# Patient Record
Sex: FEMALE | Race: WHITE | NOT HISPANIC OR LATINO | Employment: PART TIME | URBAN - METROPOLITAN AREA
[De-identification: names, ages, dates, MRNs, and addresses within clinical notes are randomized per-mention and may not be internally consistent; named-entity substitution may affect disease eponyms.]

---

## 2017-02-03 ENCOUNTER — OFFICE VISIT (OUTPATIENT)
Dept: DERMATOLOGY | Facility: CLINIC | Age: 37
End: 2017-02-03

## 2017-02-03 DIAGNOSIS — Z79.899 MEDICATION MANAGEMENT: ICD-10-CM

## 2017-02-03 DIAGNOSIS — L40.9 PSORIASIS: Primary | ICD-10-CM

## 2017-02-03 LAB
ALT SERPL W P-5'-P-CCNC: 40 U/L (ref 0–50)
AST SERPL W P-5'-P-CCNC: 29 U/L (ref 0–45)
BASOPHILS # BLD AUTO: 0.2 10E9/L (ref 0–0.2)
BASOPHILS NFR BLD AUTO: 1.9 %
DIFFERENTIAL METHOD BLD: ABNORMAL
EOSINOPHIL # BLD AUTO: 0.9 10E9/L (ref 0–0.7)
EOSINOPHIL NFR BLD AUTO: 7.5 %
ERYTHROCYTE [DISTWIDTH] IN BLOOD BY AUTOMATED COUNT: 13.2 % (ref 10–15)
HBV CORE AB SERPL QL IA: NONREACTIVE
HBV SURFACE AG SERPL QL IA: NONREACTIVE
HCT VFR BLD AUTO: 45.8 % (ref 35–47)
HCV AB SERPL QL IA: NORMAL
HGB BLD-MCNC: 15.1 G/DL (ref 11.7–15.7)
HIV 1+2 AB+HIV1 P24 AG SERPL QL IA: NORMAL
IMM GRANULOCYTES # BLD: 0.1 10E9/L (ref 0–0.4)
IMM GRANULOCYTES NFR BLD: 0.4 %
LYMPHOCYTES # BLD AUTO: 2.7 10E9/L (ref 0.8–5.3)
LYMPHOCYTES NFR BLD AUTO: 24.1 %
MCH RBC QN AUTO: 30.9 PG (ref 26.5–33)
MCHC RBC AUTO-ENTMCNC: 33 G/DL (ref 31.5–36.5)
MCV RBC AUTO: 94 FL (ref 78–100)
MONOCYTES # BLD AUTO: 0.8 10E9/L (ref 0–1.3)
MONOCYTES NFR BLD AUTO: 6.7 %
NEUTROPHILS # BLD AUTO: 6.7 10E9/L (ref 1.6–8.3)
NEUTROPHILS NFR BLD AUTO: 59.4 %
NRBC # BLD AUTO: 0 10*3/UL
NRBC BLD AUTO-RTO: 0 /100
PLATELET # BLD AUTO: 383 10E9/L (ref 150–450)
RBC # BLD AUTO: 4.88 10E12/L (ref 3.8–5.2)
WBC # BLD AUTO: 11.3 10E9/L (ref 4–11)

## 2017-02-03 PROCEDURE — 86704 HEP B CORE ANTIBODY TOTAL: CPT | Performed by: DERMATOLOGY

## 2017-02-03 PROCEDURE — 87340 HEPATITIS B SURFACE AG IA: CPT | Performed by: DERMATOLOGY

## 2017-02-03 PROCEDURE — 86803 HEPATITIS C AB TEST: CPT | Performed by: DERMATOLOGY

## 2017-02-03 PROCEDURE — 86480 TB TEST CELL IMMUN MEASURE: CPT | Performed by: DERMATOLOGY

## 2017-02-03 PROCEDURE — 87389 HIV-1 AG W/HIV-1&-2 AB AG IA: CPT | Performed by: DERMATOLOGY

## 2017-02-03 RX ORDER — FLUOCINONIDE TOPICAL SOLUTION USP, 0.05% 0.5 MG/ML
SOLUTION TOPICAL
Qty: 60 ML | Refills: 5 | Status: SHIPPED | OUTPATIENT
Start: 2017-02-03 | End: 2017-05-04

## 2017-02-03 RX ORDER — CALCIPOTRIENE, BETAMETHASONE DIPROPIONATE 50; .643 UG/G; MG/G
OINTMENT TOPICAL
Qty: 100 G | Refills: 5 | Status: SHIPPED | OUTPATIENT
Start: 2017-02-03 | End: 2017-07-18

## 2017-02-03 ASSESSMENT — PAIN SCALES - GENERAL: PAINLEVEL: NO PAIN (0)

## 2017-02-03 NOTE — NURSING NOTE
Dermatology Rooming Note    Ryan Moon's goals for this visit include:   Chief Complaint   Patient presents with     Derm Problem     Ryan comes to clinic for psoriasis follow up, would like med refills     Steffanie Rowe LPN

## 2017-02-03 NOTE — PATIENT INSTRUCTIONS
Today:    1. We will get some bloodwork to make sure Humira still safe.  2. Once blood work returns, will send prescription for Humira, pending everything looks okay.  3. Keep on 40 mg every other week, no need for a loading dose this time.  4. Keep up the good work with the topical medications.  --will refill the Taclonex ointment and fluocinonide solution

## 2017-02-03 NOTE — LETTER
2/3/2017       RE: Ryan Moon  [address]  [facility] [address]     Dear Colleague,    Thank you for referring your patient, Ryan Moon, to the [facility] DERMATOLOGY at [facility]. Please see a copy of my visit note below.    DERMATOLOGY CLINIC FOLLOW-UP VISIT  2/3/17  Last visit: May 26, 2016    DERMATOLOGY PROBLEM LIST  1. Psorasis, with possibly PsA: adalimumab 40 mg [id number], calcipotriene betamethasone dipropionate 0.005-0.064 % oint BID PRN body, fluocinonide 0.05% solution scalp PRN  2. Pruritis NOS - resolved    HPI: Ms. Moon is a 35 year old female who presents for f/u of psoriasis. At last visit in May 2016, she was prescribed Humira as her psoriasis was worsening despite heavy topical use.  She reports that the Humira finally went through the prior authorization in July 2016 and she started it then. She started with a loading dose but then went to 40 mg every other week. She notes her skin improved quite a bit when she was on this medication, but unfortunately she missed her October f/u appt and has been off the Humira for about 2 months now. Her skin was largely clear but is now starting to come back (on the forearms especially).    She is currently using Taclonex (calcipotriene betamethasone dipropionate) ointment a couple of times a week when she remembers. She does not like how oily it can be on her clothes. She uses fluocinonide 0.05% soln to the scalp every few weeks. Not really using hydrocortisone 0.2% cream.    She denies any joint pain today. Does note some stiffness of her hands in the morning but thinks this lasts only 15 minutes and notes h/o carpal tunnel. Denies any changes in her fingernails.    No other skin concerns or rashes. No other rapidly-growing, bleeding, tender, nonhealing, painful, or tender lesions elsewhere.  No recent illness, fevers, chills, drenching night sweats, loss of appetite, or unexpected weight  changes.    She continues to spoke about 1PPD, has completed substance abuse treatment without any known chronic hepatic or renal insufficiency. She drinks ~ 3 alcoholic beverages per weekend.    She notes itching she reported at last visit resolved shortly after her May appt.      ROS:   CONST: No f/c/ns  SKIN: No new/changing moles  Lymph: No subcutaneous lumps/bumps    Past Medical History   Diagnosis Date     Tobacco use disorder      6 yrs   1 PPD     Cannabis abuse, episodic      Last used 6 wks ago (3/30/07) per pt.     Other psoriasis and similar disorders        Current Outpatient Prescriptions   Medication Sig Dispense Refill     calcipotriene-betameth diprop (TACLONEX) 0.005-0.064 % OINT Apply topically twice daily to affected areas. Do not apply to the body folds. 100 g 5     adalimumab (HUMIRA) 40 MG/0.8ML pen kit Day 1: Inject 0.8 mLs (80 mg) subcutaneously.  Day 7:  Inject 0.4 mLs (40 mg); repeat every 14 days. 10 Syringe 0     calcipotriene-betameth diprop 0.005-0.064 % OINT Apply twice daily to affected areas on the trunk and and extremities was needed. 100 g 3     fluocinolone (SYNALAR) 0.01 % external solution APPLY TOPICALLY DAILY TO THE SCALP.  5     fluocinonide (LIDEX) 0.05 % ointment Apply twice daily to itchy areas on the legs and hands. Do not apply to the face or body folds. 60 g 3     hydrOXYzine (VISTARIL) 25 MG capsule Take 1-2 pills by mouth nightly as needed for itching. 90 capsule 3     hydrocortisone (WESTCORT) 0.2 % cream Apply topically 2 times daily Apply topically twice daily to the face and body folds. 60 g 5     fluocinonide (LIDEX) 0.05 % external solution Apply topically daily to the scalp. 60 mL 5     levonorgestrel (MIRENA) 20 MCG/24HR IUD 1 each by Intrauterine route 1 each by Intrauterine route See Admin Instructions. To be administered once every 5 years in clinic office.         No Known Allergies    SH: Works in industrial cleaning.    PHYSICAL EXAMINATION:  There  were no vitals taken for this visit.  Gen: Well-appearing, well-nourished no acute discomfort or distress.  SKIN: Full body examination of the scalp, face, ears, chest, back, abdomen, bilateral upper and lower extremities, buttocks, groin, mouth and nails significant for skin phototype type II, mild-to-moderate dermatoheliosis and:  - thin pink guttate papules and arcuate plaques with minimal scale on bilateral forearms and axillae  - nail polish on all 10 fingernails  - face, abdomen and legs clear.  - remainder unremarkable.    LABS:   3/24/17:   LFTs WNL  CBC w/ diff WNL  TB quant gold negative  HepB surf Ag nonreactive, HepC Ab negative HIV non-reactive  **-- no HepB core Ab performed--    Labs today pending    IMPRESSION AND PLAN:  1. Psoriasis, > 10% BSA possibly with PsA, improving with use of Humira with lapse in treatment due to lack of follow-up:  - Complete monitoring blood work today to include TB quantiferon gold, annual HIV, annual hepatitis panel, CBC w/ diff and AST, and ALT  - Continue adalimumab 40 mg Q 14 days, pending normal monitoring labs  - continue calcipotriene betamethasone dipropionate ointment twice daily to trunk/extremities  - continue hydrocortisone 0.2% cream to face/folds  - continue fluocinonide 0.05% soln to the scalp    RTC 6 months.    Discussed and examined with staff dermatologist Dr. Toi Montgomery MD  PGY-3 Dermatology  Pager: [phone]    Again, thank you for allowing me to participate in the care of your patient.      Sincerely,    Toi Carvalho MD

## 2017-02-03 NOTE — MR AVS SNAPSHOT
After Visit Summary   2/3/2017    Ryan Moon    MRN: [id number]           Patient Information     Date Of Birth          1980        Visit Information        Provider Department      2/3/2017 11:00 AM Toi Carvalho MD [facility]        Today's Diagnoses     Psoriasis    -  1     Medication management           Care Instructions    Today:    1. We will get some bloodwork to make sure Humira still safe.  2. Once blood work returns, will send prescription for Humira, pending everything looks okay.  3. Keep on 40 mg every other week, no need for a loading dose this time.  4. Keep up the good work with the topical medications.  --will refill the Taclonex ointment and fluocinonide solution        Follow-ups after your visit        Your next 10 appointments already scheduled     Feb 03, 2017 12:00 PM   LAB with [facility] ([facility])    [address] [place] [id number] [phone]           Patient must bring picture ID.  Patient should be prepared to give a urine specimen  Please do not eat 10-12 hours before your appointment if you are coming in fasting for labs on lipids, cholesterol, or glucose (sugar).  Pregnant women should follow their Care Team instructions. Water with medications is okay. Do not drink coffee or other fluids.   If you have concerns about taking  your medications, please ask at office or if scheduling via WOMN, send a message by clicking on Secure Messaging, Message Your Care Team.              Future tests that were ordered for you today     Open Future Orders        Priority Expected Expires Ordered    AST Routine  2/3/2018 2/3/2017    Hepatitis B core antibody Routine  2/4/2018 2/3/2017    Hepatitis B surface antigen Routine  2/3/2018 2/3/2017    Hepatitis C antibody Routine  2/3/2018 2/3/2017    HIV Antigen Antibody Combo Routine  2/3/2018 2/3/2017            Who to contact      Please call your clinic at [phone] to:    Ask questions about your health    Make or cancel appointments    Discuss your medicines    Learn about your test results    Speak to your doctor   If you have compliments or concerns about an experience at your clinic, or if you wish to file a complaint, please contact [facility] Patient Relations at [phone] or email us at [email or web address]         Additional Information About Your Visit        mii Information     mii is an electronic gateway that provides easy, online access to your medical records. With mii, you can request a clinic appointment, read your test results, renew a prescription or communicate with your care team.     To sign up for mii visit the website at [email or web address]   You will be asked to enter the access code listed below, as well as some personal information. Please follow the directions to create your username and password.     Your access code is: [email or web address]  Expires: 5/3/2017  6:30 AM     Your access code will  in 90 days. If you need help or a new code, please contact your [facility] Clinic or call [phone] for assistance.        Care EveryWhere ID     This is your Care EveryWhere ID. This could be used by other organizations to access your [facility] medical records  [id number]         Blood Pressure from Last 3 Encounters:   14 136/88   13 133/86   11 94/70    Weight from Last 3 Encounters:   14 62.642 kg (138 lb 1.6 oz)   13 62.914 kg (138 lb 11.2 oz)   11 61.689 kg (136 lb)              We Performed the Following     ALT     CBC with platelets differential     M Tuberculosis by Quantiferon          Where to get your medicines      These medications were sent to [facility] [address] [place] [address] KYLE KUMAR  [address] AT [facility] [address]  [address]  [address]     Phone:  [phone]    - calcipotriene-betameth diprop 0.005-0.064 % Oint  - fluocinonide 0.05 % solution       Primary Care Provider Fax #    [facility] [phone] [address] [facility] [address]        Thank you!     Thank you for choosing [facility] DERMATOLOGY  for your care. Our goal is always to provide you with excellent care. Hearing back from our patients is one way we can continue to improve our services. Please take a few minutes to complete the written survey that you may receive in the mail after your visit with us. Thank you!             Your Updated Medication List - Protect others around you: Learn how to safely use, store and throw away your medicines at www.disposemymeds.org.          This list is accurate as of: 2/3/17 11:52 AM.  Always use your most recent med list.                   Brand Name Dispense Instructions for use    adalimumab 40 MG/0.8ML pen kit    HUMIRA    10 Syringe    Day 1: Inject 0.8 mLs (80 mg) subcutaneously.  Day 7:  Inject 0.4 mLs (40 mg); repeat every 14 days.       * calcipotriene-betameth diprop 0.005-0.064 % Oint     100 g    Apply twice daily to affected areas on the trunk and and extremities was needed.       * calcipotriene-betameth diprop 0.005-0.064 % Oint    TACLONEX    100 g    Apply topically twice daily to affected areas. Do not apply to the body folds.       fluocinolone 0.01 % solution    SYNALAR     APPLY TOPICALLY DAILY TO THE SCALP.       * fluocinonide 0.05 % ointment    LIDEX    60 g    Apply twice daily to itchy areas on the legs and hands. Do not apply to the face or body folds.       * fluocinonide 0.05 % solution    LIDEX    60 mL    Apply topically daily to the scalp.       hydrocortisone 0.2 % cream    WESTCORT    60 g    Apply topically 2 times daily Apply topically twice daily to the face and body folds.       hydrOXYzine 25 MG capsule    VISTARIL    90 capsule    Take 1-2 pills by mouth  nightly as needed for itching.       MIRENA (52 MG) 20 MCG/24HR IUD   Generic drug:  levonorgestrel      1 each by Intrauterine route 1 each by Intrauterine route See Admin Instructions. To be administered once every 5 years in clinic office.       * Notice:  This list has 4 medication(s) that are the same as other medications prescribed for you. Read the directions carefully, and ask your doctor or other care provider to review them with you.

## 2017-02-03 NOTE — PROGRESS NOTES
DERMATOLOGY CLINIC FOLLOW-UP VISIT  2/3/17  Last visit: May 26, 2016    DERMATOLOGY PROBLEM LIST  1. Psorasis, with possibly PsA: adalimumab 40 mg [id number], calcipotriene betamethasone dipropionate 0.005-0.064 % oint BID PRN body, fluocinonide 0.05% solution scalp PRN  2. Pruritis NOS - resolved    HPI: Ms. Moon is a 35 year old female who presents for f/u of psoriasis. At last visit in May 2016, she was prescribed Humira as her psoriasis was worsening despite heavy topical use.  She reports that the Humira finally went through the prior authorization in July 2016 and she started it then. She started with a loading dose but then went to 40 mg every other week. She notes her skin improved quite a bit when she was on this medication, but unfortunately she missed her October f/u appt and has been off the Humira for about 2 months now. Her skin was largely clear but is now starting to come back (on the forearms especially).    She is currently using Taclonex (calcipotriene betamethasone dipropionate) ointment a couple of times a week when she remembers. She does not like how oily it can be on her clothes. She uses fluocinonide 0.05% soln to the scalp every few weeks. Not really using hydrocortisone 0.2% cream.    She denies any joint pain today. Does note some stiffness of her hands in the morning but thinks this lasts only 15 minutes and notes h/o carpal tunnel. Denies any changes in her fingernails.    No other skin concerns or rashes. No other rapidly-growing, bleeding, tender, nonhealing, painful, or tender lesions elsewhere.  No recent illness, fevers, chills, drenching night sweats, loss of appetite, or unexpected weight changes.    She continues to spoke about 1PPD, has completed substance abuse treatment without any known chronic hepatic or renal insufficiency. She drinks ~ 3 alcoholic beverages per weekend.    She notes itching she reported at last visit resolved shortly after her May appt.      ROS:    CONST: No f/c/ns  SKIN: No new/changing moles  Lymph: No subcutaneous lumps/bumps    Past Medical History   Diagnosis Date     Tobacco use disorder      6 yrs   1 PPD     Cannabis abuse, episodic      Last used 6 wks ago (3/30/07) per pt.     Other psoriasis and similar disorders        Current Outpatient Prescriptions   Medication Sig Dispense Refill     calcipotriene-betameth diprop (TACLONEX) 0.005-0.064 % OINT Apply topically twice daily to affected areas. Do not apply to the body folds. 100 g 5     adalimumab (HUMIRA) 40 MG/0.8ML pen kit Day 1: Inject 0.8 mLs (80 mg) subcutaneously.  Day 7:  Inject 0.4 mLs (40 mg); repeat every 14 days. 10 Syringe 0     calcipotriene-betameth diprop 0.005-0.064 % OINT Apply twice daily to affected areas on the trunk and and extremities was needed. 100 g 3     fluocinolone (SYNALAR) 0.01 % external solution APPLY TOPICALLY DAILY TO THE SCALP.  5     fluocinonide (LIDEX) 0.05 % ointment Apply twice daily to itchy areas on the legs and hands. Do not apply to the face or body folds. 60 g 3     hydrOXYzine (VISTARIL) 25 MG capsule Take 1-2 pills by mouth nightly as needed for itching. 90 capsule 3     hydrocortisone (WESTCORT) 0.2 % cream Apply topically 2 times daily Apply topically twice daily to the face and body folds. 60 g 5     fluocinonide (LIDEX) 0.05 % external solution Apply topically daily to the scalp. 60 mL 5     levonorgestrel (MIRENA) 20 MCG/24HR IUD 1 each by Intrauterine route 1 each by Intrauterine route See Admin Instructions. To be administered once every 5 years in clinic office.         No Known Allergies    SH: Works in industrial cleaning.    PHYSICAL EXAMINATION:  There were no vitals taken for this visit.  Gen: Well-appearing, well-nourished no acute discomfort or distress.  SKIN: Full body examination of the scalp, face, ears, chest, back, abdomen, bilateral upper and lower extremities, buttocks, groin, mouth and nails significant for skin phototype type  II, mild-to-moderate dermatoheliosis and:  - thin pink guttate papules and arcuate plaques with minimal scale on bilateral forearms and axillae  - nail polish on all 10 fingernails  - face, abdomen and legs clear.  - remainder unremarkable.    LABS:   3/24/17:   LFTs WNL  CBC w/ diff WNL  TB quant gold negative  HepB surf Ag nonreactive, HepC Ab negative HIV non-reactive  **-- no HepB core Ab performed--    Labs today pending    IMPRESSION AND PLAN:  1. Psoriasis, > 10% BSA possibly with PsA, improving with use of Humira with lapse in treatment due to lack of follow-up:  - Complete monitoring blood work today to include TB quantiferon gold, annual HIV, annual hepatitis panel, CBC w/ diff and AST, and ALT  - Continue adalimumab 40 mg Q 14 days, pending normal monitoring labs  - continue calcipotriene betamethasone dipropionate ointment twice daily to trunk/extremities  - continue hydrocortisone 0.2% cream to face/folds  - continue fluocinonide 0.05% soln to the scalp    RTC 6 months.    Discussed and examined with staff dermatologist Dr. Toi Montgomery MD  PGY-3 Dermatology  Pager: [phone]    I was present during the key portions of the history and physical exam.  I agree with the treatment plan. CHUY Carvalho MD

## 2017-02-06 LAB
M TB TUBERC IFN-G BLD QL: NEGATIVE
M TB TUBERC IFN-G/MITOGEN IGNF BLD: 0 IU/ML

## 2017-02-07 DIAGNOSIS — L40.0 PLAQUE PSORIASIS: Primary | ICD-10-CM

## 2017-03-08 ENCOUNTER — TELEPHONE (OUTPATIENT)
Dept: DERMATOLOGY | Facility: CLINIC | Age: 37
End: 2017-03-08

## 2017-03-08 NOTE — TELEPHONE ENCOUNTER
[facility] Prior Authorization Team   Phone: [phone]  Fax: [phone]    PA Initiation    Medication: HUMIRA PEN 40MG BIWEEKLY  Insurance Company: [place] Medicaid ([facility]) - Phone [phone] Fax [phone]  Pharmacy Filling the Rx: [facility] - [place], [place] - [address]  Filling Pharmacy Phone: [phone]  Filling Pharmacy Fax: [phone]  Start Date: 3/8/2017

## 2017-03-09 NOTE — TELEPHONE ENCOUNTER
Prior Authorization Approval    Authorization Effective Date: 3/8/2017  Authorization Expiration Date: 3/31/2017  Medication: HUMIRA PEN 40MG BIWEEKLY - Approved  Approved Dose/Quantity: biweekly  Reference #: [id number]   Insurance Company: [place] Medicaid ([facility]) - Phone [phone] Fax [phone]  Expected CoPay: $3.00     CoPay Card Available:      Foundation Assistance Needed:    Which Pharmacy is filling the prescription (Not needed for infusion/clinic administered): [facility] - [place], [place] - [facility] [address]  Pharmacy Notified: Yes  Patient Notified: Yes

## 2017-05-02 ENCOUNTER — TELEPHONE (OUTPATIENT)
Dept: DERMATOLOGY | Facility: CLINIC | Age: 37
End: 2017-05-02

## 2017-05-02 NOTE — TELEPHONE ENCOUNTER
----- Message from Grazyna Bernal sent at 5/2/2017  1:07 PM CDT -----  Regarding: pt has a rash on legs and arms, painful, for the past two days, getting worse...  Contact: [phone]  Pt is scheduled to see Dr. Peralta on 6/01/17, but she needs to be seen asap.    Please call pt at [phone].    Thank you,  Dimas STERN    Please DO NOT send this message and/or reply back to sender.  Call Center Representatives DO NOT respond to messages.

## 2017-05-02 NOTE — TELEPHONE ENCOUNTER
"Patient feels she may be possibly having a reaction to her Humira. Has rash that started 3 days ago. Describes as small, red, raised, \"dots\" that itch. Denies drainage or pain. States rash is spreading and is on her arms, legs and some on her face. No SOB noted, speaks in full sentences. Has not tried anti-itch or anti-histamine. She spoke with her \"humira nurse\" who recommended that she call dermatology to get in. Started humira again (was off for brief time) in March, last dose 2 weeks ago. Will route to resident on call for recommendations of urgency for appointment as there is no availability this week. Patient may go to UC today for further r/o while she waits to hear back. States understanding of seeking care if condition worsens.  "

## 2017-05-02 NOTE — TELEPHONE ENCOUNTER
Spoke with the patient this afternoon. She reports a rash on her forearms, thighs, palms. She notes the rash is pruritic on and off. She is not taking any oral medications for this. She saw another primary care doctor today who thought it may be scabies and gave her a cream for this. (permethrin). She notes the bumps look like pimples, some of them white on the top. She has scratched some of them to the point of scabbing. No fevers, chills, SOB, throat discomfort. She was able to schedule an appointment with dermatology on June 1st but may go to urgent care prior to this. Will discuss further with staff in the AM regarding the possibility of over-booking her into clinic this week and will let the patient know tomorrow.

## 2017-05-03 NOTE — TELEPHONE ENCOUNTER
Spoke with patient. She is available to come in tomorrow at 12:45. Will coordinate in getting this appt scheduled. Patient will be seen by the SAMEER.

## 2017-05-03 NOTE — TELEPHONE ENCOUNTER
Staffed with Dr. Arshad 5/2/17 who recommended scheduling in Dr. Nath's clinic 5/4. Discussed with Dr. Nath today.     Left message for patient this morning regarding follow up. If she calls back, okay to fit her into Dr. Nath's clinic 5/4 at 12:45.     Nancy Camarena MD  PGY-2, Dermatology

## 2017-05-04 ENCOUNTER — OFFICE VISIT (OUTPATIENT)
Dept: DERMATOLOGY | Facility: CLINIC | Age: 37
End: 2017-05-04

## 2017-05-04 DIAGNOSIS — B35.3 TINEA PEDIS OF BOTH FEET: ICD-10-CM

## 2017-05-04 DIAGNOSIS — L30.9 DERMATITIS, UNSPECIFIED: Primary | ICD-10-CM

## 2017-05-04 PROCEDURE — 87070 CULTURE OTHR SPECIMN AEROBIC: CPT | Performed by: DERMATOLOGY

## 2017-05-04 RX ORDER — CETIRIZINE HYDROCHLORIDE 10 MG/1
10 TABLET ORAL 2 TIMES DAILY
Qty: 60 TABLET | Refills: 1 | Status: SHIPPED | OUTPATIENT
Start: 2017-05-04 | End: 2017-07-18

## 2017-05-04 RX ORDER — TRIAMCINOLONE ACETONIDE 1 MG/G
CREAM TOPICAL 2 TIMES DAILY
Qty: 454 G | Refills: 0 | Status: SHIPPED | OUTPATIENT
Start: 2017-05-04 | End: 2017-07-18

## 2017-05-04 RX ORDER — CLOTRIMAZOLE 1 %
CREAM (GRAM) TOPICAL 2 TIMES DAILY
Qty: 30 G | Refills: 2 | Status: SHIPPED | OUTPATIENT
Start: 2017-05-04 | End: 2022-07-19

## 2017-05-04 RX ORDER — PERMETHRIN 50 MG/G
CREAM TOPICAL
Refills: 0 | COMMUNITY
Start: 2017-04-28 | End: 2017-07-18

## 2017-05-04 ASSESSMENT — PAIN SCALES - GENERAL: PAINLEVEL: NO PAIN (0)

## 2017-05-04 NOTE — NURSING NOTE
"Chief Complaint   Patient presents with     Derm Problem     Ryan is here today for rash that has spread everywhere on her body. \"driving me nuts and itching like crazy\"     Rober Gold, ODILON    "

## 2017-05-04 NOTE — PATIENT INSTRUCTIONS
Start triamcinolone cream to the body twice daily as needed for itching.  Start cetirizine 10 mg twice a day.   Benadryl at night as needed.      Recommendations for dry skin and dermatitis   1. Bathe or shower daily in lukewarm water  2. Use a gentle non-soap detergent cleanser  - Soaps are alkaline (which can irritate sensitive skin) and remove natural moisturizing factors   - Recommended products, in no particular order, include:   - Bars:    - Aveeno Moisturizing Bar    - Cetaphil Gentle Cleansing Bar    - Dove Sensitive Skin Unscented Beauty Bar    - Olay Ultra Moisture Bar   - Liquid Cleansers:    - Aquanil Cleanser    - CeraVe Hydrating Cleanser    - Cetaphil Gentle Skin Cleanser  - Avoid scented soaps or bath additives unless your doctor tells you otherwise  - Focus on washing the face, underarms, and underwear areas; other sites usually do not need frequent washing  3. Rinse off thoroughly, then pat dry until skin is slightly damp  4. Apply moisturizer to damp skin within 3-5 minutes of exiting the bath/shower  - Recommended products, in no particular order, include:   - Lotions (thinner/lighter, but may be less effective)    - AmLactin Cerapeutic Restoring Body Lotion    - CeraVe Facial Moisturizing Lotion (AM and/or PM)    - Lubriderm Advanced Therapy Lotion   - Creams (thicker, likely the best balance of effectiveness and feel)    - AmLactin Ultra Hydrating Body Cream    - Aveeno Eczema Therapy Moisturizing Cream    - Aveeno Eczema Therapy Itch Relief Balm    - CeraVe Itch Relief Moisturizing Cream   - Ointments (thickest)    - Vaseline  5. If prescribed a topical steroid medication, this may be applied before or after the moisturizer (whichever order you prefer)  6. Reapply moisturizer one or two additional times throughout the day when dry skin is present; once this improves, reduce to daily or every other day as needed to prevent recurrence  7. If dry skin or dermatitis is present on the hands, keep  moisturizer near the sink and apply after washing and drying your hands  8. A humidifier may be helpful during the winter months (when ambient humidity is very low)

## 2017-05-04 NOTE — PROGRESS NOTES
DERMATOLOGY CLINIC FOLLOW-UP VISIT  5/4/17  Last visit: 2/3/17    DERMATOLOGY PROBLEM LIST  1. Psorasis, with possibly PsA: adalimumab 40 mg [id number], calcipotriene betamethasone dipropionate 0.005-0.064 % oint BID PRN body, fluocinonide 0.05% solution scalp PRN  2. Pruritic eruption NOS    HPI: Ms. Moon is a 36 year old female who presents for evaluation of a rash.  She reports a rash on her arms, legs, palms, hairline that began about 4 days ago. She notes the rash is pruritic on and off and is terribly bothersome. She was initially concerned that this could be an adverse reaction to her humira. She is not taking any oral medications for this. She saw another primary care doctor on Monday who thought it may be scabies and gave her a cream for this. (permethrin). She used this from the neck down as prescribed. She does have enough left for the second treatment. She notes the bumps look like pimples, some of them white on the top. She has scratched most of them to the point of scabbing. No fevers, chills, SOB, throat discomfort. She also reports some areas of psoriasis on the forearms, elbows but states that this new rash is different from her psoriasis. She is very bothered by the fact that she could have scabies. She notes a history of 'bad skin' and endorses picking at the bumps on her face.     She started humira in July 2016 for her psoriasis. Subsequently missed her follow up so humira was discontinued and then restarted 2/2017.     She is not taking any oral antihistamines or using topical steroids on this new rash as she wanted to know what it is prior to treating. Denies any recent travel or exposure to animals. Denies any household members with similar symptoms (lives with her children and her dad). Denies change in oral medications, topical products, outdoors exposures. Can not identify additional triggers.     ROS:   CONST: No f/c/ns  SKIN: No new/changing moles    Past Medical History:   Diagnosis  Date     Cannabis abuse, episodic     Last used 6 wks ago (3/30/07) per pt.     Other psoriasis and similar disorders      Tobacco use disorder     6 yrs   1 PPD       Current Outpatient Prescriptions   Medication Sig Dispense Refill     adalimumab (HUMIRA) 40 MG/0.8ML prefilled syringe kit Inject 0.8 mLs (40 mg) Subcutaneous every 14 days 12 Syringe 0     calcipotriene-betameth diprop (TACLONEX) 0.005-0.064 % OINT Apply topically twice daily to affected areas. Do not apply to the body folds. 100 g 5     fluocinonide (LIDEX) 0.05 % solution Apply topically daily to the scalp. 60 mL 5     adalimumab (HUMIRA) 40 MG/0.8ML pen kit Day 1: Inject 0.8 mLs (80 mg) subcutaneously.  Day 7:  Inject 0.4 mLs (40 mg); repeat every 14 days. 10 Syringe 0     calcipotriene-betameth diprop 0.005-0.064 % OINT Apply twice daily to affected areas on the trunk and and extremities was needed. 100 g 3     fluocinolone (SYNALAR) 0.01 % external solution APPLY TOPICALLY DAILY TO THE SCALP.  5     fluocinonide (LIDEX) 0.05 % ointment Apply twice daily to itchy areas on the legs and hands. Do not apply to the face or body folds. 60 g 3     hydrOXYzine (VISTARIL) 25 MG capsule Take 1-2 pills by mouth nightly as needed for itching. 90 capsule 3     hydrocortisone (WESTCORT) 0.2 % cream Apply topically 2 times daily Apply topically twice daily to the face and body folds. 60 g 5     levonorgestrel (MIRENA) 20 MCG/24HR IUD 1 each by Intrauterine route 1 each by Intrauterine route See Admin Instructions. To be administered once every 5 years in clinic office.         No Known Allergies    SH: Works in industrial cleaning.    PHYSICAL EXAMINATION:  There were no vitals taken for this visit.  Gen: Well-appearing, well-nourished no acute discomfort or distress.  SKIN: Full body examination of the scalp, face, ears, chest, back, abdomen, bilateral upper and lower extremities, and nails significant for skin phototype type II, mild-to-moderate  dermatoheliosis and:  - thin pink medium pink plaques with overlying white scale on the extensor elbows, dorsal forearms  - nail polish on all 10 fingernails  - face with excoriated papules, unable to assess well due to heavy makeup over these areas  - scattered medium pink 1-2 mm papules, some pustules, most excoriated with hemorrhagic crusting, some linear in array, some appearing to be follicularly based involving the wrist, ventral forearms, upper arms, bilateral thighs, lower legs, lower abdomen, posterior neck near hairline, fewer on the upper chest/shoulders  - palms with xerotic hyperkeratotic plaques centrally  - Scaling noted on bottom of feet     LABS:   3/24/17:   LFTs WNL  CBC w/ diff WNL  TB quant gold negative  HepB surf Ag nonreactive, HepC Ab negative HIV non-reactive  **-- no HepB core Ab performed--    IMPRESSION AND PLAN:  1. Psoriasis, > 10% BSA possibly with PsA, on Humira -  - Continue adalimumab 40 mg Q 14 days  - continue calcipotriene betamethasone dipropionate ointment twice daily to trunk/extremities  - continue hydrocortisone 0.2% cream to face/folds  - continue fluocinonide 0.05% soln to the scalp    2. Pruritic eruption x 4-5 days. Highest on our differential at this time include scabies vs folliculitis vs DH vs neurotic excoriations vs other causes of pruritus--clinical exam is not totally classic for any of these. Culture obtained from one of the more pustular appearing lesions to evaluate for folliculitis. Scraping performed with mineral oil for scabies prep--no mites or scotoma appreciated. This is certainly not classic for scabies as it spares the finger webs and there are few linear lesions to suggest burrows but is certainly possible. Thankfully, patient has been appropriately treated for this and will re-treat on day 7 with permethrin as prescribed. Discussed that if this is scabies, the itching can persist for several weeks after successful treatment. Will plan to treat  symptomatically pending culture results and see her back in 2-3 weeks for follow up to ensure her symptoms are improving. If no improvement, would consider biopsy at that time.   - culture pending  - scabies prep negative  - triamcinolone cream 0.1% BID to areas of rash  - cetirizine 10 mg BID  - benadryl 25 mg qhs prn    RTC 2-3 weeks.    Discussed and examined with staff dermatologist Dr. Narciso Camarena MD  PGY-2, Dermatology    I have seen, evaluated and discussed the patient with resident physician. I have reviewed the resident physicians note and agree with their clinical findings, assessment and plan.  Appropriate changes to the resident's note have been made.    In addition, she was not to have scaling on her feet clinically consistent with tinea pedis. She was also given clotrimazole cream to use twice a day for two weeks.    Narciso Nath MD, FAAD    Departments of Internal Medicine and Dermatology  [facility]  [phone]

## 2017-05-04 NOTE — MR AVS SNAPSHOT
After Visit Summary   5/4/2017    Ryan Moon    MRN: [id number]           Patient Information     Date Of Birth          1980        Visit Information        Provider Department      5/4/2017 12:45 PM Narciso Nath MD [facility] Dermatology        Today's Diagnoses     Dermatitis, unspecified    -  1    Tinea pedis of both feet          Care Instructions    Start triamcinolone cream to the body twice daily as needed for itching.  Start cetirizine 10 mg twice a day.   Benadryl at night as needed.      Recommendations for dry skin and dermatitis   1. Bathe or shower daily in lukewarm water  2. Use a gentle non-soap detergent cleanser  - Soaps are alkaline (which can irritate sensitive skin) and remove natural moisturizing factors   - Recommended products, in no particular order, include:   - Bars:    - Aveeno Moisturizing Bar    - Cetaphil Gentle Cleansing Bar    - Dove Sensitive Skin Unscented Beauty Bar    - Olay Ultra Moisture Bar   - Liquid Cleansers:    - Aquanil Cleanser    - CeraVe Hydrating Cleanser    - Cetaphil Gentle Skin Cleanser  - Avoid scented soaps or bath additives unless your doctor tells you otherwise  - Focus on washing the face, underarms, and underwear areas; other sites usually do not need frequent washing  3. Rinse off thoroughly, then pat dry until skin is slightly damp  4. Apply moisturizer to damp skin within 3-5 minutes of exiting the bath/shower  - Recommended products, in no particular order, include:   - Lotions (thinner/lighter, but may be less effective)    - AmLactin Cerapeutic Restoring Body Lotion    - CeraVe Facial Moisturizing Lotion (AM and/or PM)    - Lubriderm Advanced Therapy Lotion   - Creams (thicker, likely the best balance of effectiveness and feel)    - AmLactin Ultra Hydrating Body Cream    - Aveeno Eczema Therapy Moisturizing Cream    - Aveeno Eczema Therapy Itch Relief Balm    - CeraVe Itch Relief Moisturizing Cream   - Ointments  (thickest)    - Vaseline  5. If prescribed a topical steroid medication, this may be applied before or after the moisturizer (whichever order you prefer)  6. Reapply moisturizer one or two additional times throughout the day when dry skin is present; once this improves, reduce to daily or every other day as needed to prevent recurrence  7. If dry skin or dermatitis is present on the hands, keep moisturizer near the sink and apply after washing and drying your hands  8. A humidifier may be helpful during the winter months (when ambient humidity is very low)          Follow-ups after your visit        Follow-up notes from your care team     Return in about 3 weeks (around 5/25/2017).      Your next 10 appointments already scheduled     Jun 01, 2017  3:15 PM CDT   (Arrive by 3:00 PM)   Return Visit with MAXIMINO Peralta MD   [facility] ([facility])    [address] [place] [address] [id number] [phone]              Who to contact     Please call your clinic at [phone] to:    Ask questions about your health    Make or cancel appointments    Discuss your medicines    Learn about your test results    Speak to your doctor   If you have compliments or concerns about an experience at your clinic, or if you wish to file a complaint, please contact [facility] Patient Relations at [phone] or email us at [email or web address]         Additional Information About Your Visit        MyChart Information     [employer] is an electronic gateway that provides easy, online access to your medical records. With [employer], you can request a clinic appointment, read your test results, renew a prescription or communicate with your care team.     To sign up for [employer] visit the website at [email or web address]   You will be asked to enter the access code listed below, as well as some personal information. Please follow the directions to  create your username and password.     Your access code is: [email or web address]  Expires: 2017  6:30 AM     Your access code will  in 90 days. If you need help or a new code, please contact your [facility] or call [phone] for assistance.        Care EveryWhere ID     This is your Care EveryWhere ID. This could be used by other organizations to access your [place] medical records  [id number]         Blood Pressure from Last 3 Encounters:   14 136/88   13 133/86   11 94/70    Weight from Last 3 Encounters:   14 62.6 kg (138 lb 1.6 oz)   13 62.9 kg (138 lb 11.2 oz)   11 61.7 kg (136 lb)              We Performed the Following     Wound Culture Aerobic Bacterial          Today's Medication Changes          These changes are accurate as of: 17  1:34 PM.  If you have any questions, ask your nurse or doctor.               Start taking these medicines.        Dose/Directions    cetirizine 10 MG tablet   Commonly known as:  zyrTEC   Used for:  Dermatitis, unspecified   Started by:  Narciso Nath MD        Dose:  10 mg   Take 1 tablet (10 mg) by mouth 2 times daily   Quantity:  60 tablet   Refills:  1       clotrimazole 1 % cream   Commonly known as:  LOTRIMIN   Used for:  Tinea pedis of both feet   Started by:  Narciso Nath MD        Apply topically 2 times daily   Quantity:  30 g   Refills:  2       triamcinolone 0.1 % cream   Commonly known as:  KENALOG   Used for:  Dermatitis, unspecified   Started by:  Narciso Nath MD        Apply topically 2 times daily   Quantity:  454 g   Refills:  0         These medicines have changed or have updated prescriptions.        Dose/Directions    calcipotriene-betameth diprop 0.005-0.064 % Oint   Commonly known as:  TACLONEX   This may have changed:  Another medication with the same name was removed. Continue taking this medication, and follow the directions you see here.   Used  for:  Psoriasis   Changed by:  Toi Carvalho MD        Apply topically twice daily to affected areas. Do not apply to the body folds.   Quantity:  100 g   Refills:  5         Stop taking these medicines if you haven't already. Please contact your care team if you have questions.     fluocinonide 0.05 % ointment   Commonly known as:  LIDEX   Stopped by:  Narciso Nath MD           fluocinonide 0.05 % solution   Commonly known as:  LIDEX   Stopped by:  Narciso Nath MD           hydrocortisone 0.2 % cream   Commonly known as:  WESTCORT   Stopped by:  Narciso Nath MD                Where to get your medicines      These medications were sent to [facility] [address] [place] [address] [address] AT [facility] OF  [address]  [address] [facility] [address]     Phone:  [phone]     cetirizine 10 MG tablet    clotrimazole 1 % cream    triamcinolone 0.1 % cream                Primary Care Provider Fax #    [facility] [phone] [address] [facility] [address]        Thank you!     Thank you for choosing [facility] DERMAT[facility]  for your care. Our goal is always to provide you with excellent care. Hearing back from our patients is one way we can continue to improve our services. Please take a few minutes to complete the written survey that you may receive in the mail after your visit with us. Thank you!             Your Updated Medication List - Protect others around you: Learn how to safely use, store and throw away your medicines at www.disposemymeds.org.          This list is accurate as of: 5/4/17  1:34 PM.  Always use your most recent med list.                   Brand Name Dispense Instructions for use    * adalimumab 40 MG/0.8ML pen kit    HUMIRA    10 Syringe    Day 1: Inject 0.8 mLs (80 mg) subcutaneously.  Day 7:  Inject 0.4 mLs (40 mg); repeat every 14 days.       * adalimumab 40 MG/0.8ML prefilled syringe kit     HUMIRA    12 Syringe    Inject 0.8 mLs (40 mg) Subcutaneous every 14 days       calcipotriene-betameth diprop 0.005-0.064 % Oint    TACLONEX    100 g    Apply topically twice daily to affected areas. Do not apply to the body folds.       cetirizine 10 MG tablet    zyrTEC    60 tablet    Take 1 tablet (10 mg) by mouth 2 times daily       clotrimazole 1 % cream    LOTRIMIN    30 g    Apply topically 2 times daily       fluocinolone 0.01 % solution    SYNALAR     APPLY TOPICALLY DAILY TO THE SCALP.       hydrOXYzine 25 MG capsule    VISTARIL    90 capsule    Take 1-2 pills by mouth nightly as needed for itching.       MIRENA (52 MG) 20 MCG/24HR IUD   Generic drug:  levonorgestrel      1 each by Intrauterine route 1 each by Intrauterine route See Admin Instructions. To be administered once every 5 years in clinic office.       permethrin 5 % cream    ELIMITE     APPLY TO SKIN LEAVE ON FOR 8-14 HOURS THEN SHOWER       triamcinolone 0.1 % cream    KENALOG    454 g    Apply topically 2 times daily       * Notice:  This list has 2 medication(s) that are the same as other medications prescribed for you. Read the directions carefully, and ask your doctor or other care provider to review them with you.

## 2017-05-04 NOTE — LETTER
5/4/2017       RE: Ryan Moon  [address]  [facility] [address]     Dear Colleague,    Thank you for referring your patient, Ryan Moon, to the [facility] at [facility]. Please see a copy of my visit note below.    DERMATOLOGY CLINIC FOLLOW-UP VISIT  5/4/17  Last visit: 2/3/17    DERMATOLOGY PROBLEM LIST  1. Psorasis, with possibly PsA: adalimumab 40 mg [id number], calcipotriene betamethasone dipropionate 0.005-0.064 % oint BID PRN body, fluocinonide 0.05% solution scalp PRN  2. Pruritic eruption NOS    HPI: Ms. Moon is a 36 year old female who presents for evaluation of a rash.  She reports a rash on her arms, legs, palms, hairline that began about 4 days ago. She notes the rash is pruritic on and off and is terribly bothersome. She was initially concerned that this could be an adverse reaction to her humira. She is not taking any oral medications for this. She saw another primary care doctor on Monday who thought it may be scabies and gave her a cream for this. (permethrin). She used this from the neck down as prescribed. She does have enough left for the second treatment. She notes the bumps look like pimples, some of them white on the top. She has scratched most of them to the point of scabbing. No fevers, chills, SOB, throat discomfort. She also reports some areas of psoriasis on the forearms, elbows but states that this new rash is different from her psoriasis. She is very bothered by the fact that she could have scabies. She notes a history of 'bad skin' and endorses picking at the bumps on her face.     She started humira in July 2016 for her psoriasis. Subsequently missed her follow up so humira was discontinued and then restarted 2/2017.     She is not taking any oral antihistamines or using topical steroids on this new rash as she wanted to know what it is prior to treating. Denies any recent travel or exposure to animals.  Denies any household members with similar symptoms (lives with her children and her dad). Denies change in oral medications, topical products, outdoors exposures. Can not identify additional triggers.     ROS:   CONST: No f/c/ns  SKIN: No new/changing moles    Past Medical History:   Diagnosis Date     Cannabis abuse, episodic     Last used 6 wks ago (3/30/07) per pt.     Other psoriasis and similar disorders      Tobacco use disorder     6 yrs   1 PPD       Current Outpatient Prescriptions   Medication Sig Dispense Refill     adalimumab (HUMIRA) 40 MG/0.8ML prefilled syringe kit Inject 0.8 mLs (40 mg) Subcutaneous every 14 days 12 Syringe 0     calcipotriene-betameth diprop (TACLONEX) 0.005-0.064 % OINT Apply topically twice daily to affected areas. Do not apply to the body folds. 100 g 5     fluocinonide (LIDEX) 0.05 % solution Apply topically daily to the scalp. 60 mL 5     adalimumab (HUMIRA) 40 MG/0.8ML pen kit Day 1: Inject 0.8 mLs (80 mg) subcutaneously.  Day 7:  Inject 0.4 mLs (40 mg); repeat every 14 days. 10 Syringe 0     calcipotriene-betameth diprop 0.005-0.064 % OINT Apply twice daily to affected areas on the trunk and and extremities was needed. 100 g 3     fluocinolone (SYNALAR) 0.01 % external solution APPLY TOPICALLY DAILY TO THE SCALP.  5     fluocinonide (LIDEX) 0.05 % ointment Apply twice daily to itchy areas on the legs and hands. Do not apply to the face or body folds. 60 g 3     hydrOXYzine (VISTARIL) 25 MG capsule Take 1-2 pills by mouth nightly as needed for itching. 90 capsule 3     hydrocortisone (WESTCORT) 0.2 % cream Apply topically 2 times daily Apply topically twice daily to the face and body folds. 60 g 5     levonorgestrel (MIRENA) 20 MCG/24HR IUD 1 each by Intrauterine route 1 each by Intrauterine route See Admin Instructions. To be administered once every 5 years in clinic office.         No Known Allergies    SH: Works in industrial cleaning.    PHYSICAL EXAMINATION:  There were  no vitals taken for this visit.  Gen: Well-appearing, well-nourished no acute discomfort or distress.  SKIN: Full body examination of the scalp, face, ears, chest, back, abdomen, bilateral upper and lower extremities, and nails significant for skin phototype type II, mild-to-moderate dermatoheliosis and:  - thin pink medium pink plaques with overlying white scale on the extensor elbows, dorsal forearms  - nail polish on all 10 fingernails  - face with excoriated papules, unable to assess well due to heavy makeup over these areas  - scattered medium pink 1-2 mm papules, some pustules, most excoriated with hemorrhagic crusting, some linear in array, some appearing to be follicularly based involving the wrist, ventral forearms, upper arms, bilateral thighs, lower legs, lower abdomen, posterior neck near hairline, fewer on the upper chest/shoulders  - palms with xerotic hyperkeratotic plaques centrally  - Scaling noted on bottom of feet     LABS:   3/24/17:   LFTs WNL  CBC w/ diff WNL  TB quant gold negative  HepB surf Ag nonreactive, HepC Ab negative HIV non-reactive  **-- no HepB core Ab performed--    IMPRESSION AND PLAN:  1. Psoriasis, > 10% BSA possibly with PsA, on Humira -  - Continue adalimumab 40 mg Q 14 days  - continue calcipotriene betamethasone dipropionate ointment twice daily to trunk/extremities  - continue hydrocortisone 0.2% cream to face/folds  - continue fluocinonide 0.05% soln to the scalp    2. Pruritic eruption x 4-5 days. Highest on our differential at this time include scabies vs folliculitis vs DH vs neurotic excoriations vs other causes of pruritus--clinical exam is not totally classic for any of these. Culture obtained from one of the more pustular appearing lesions to evaluate for folliculitis. Scraping performed with mineral oil for scabies prep--no mites or scotoma appreciated. This is certainly not classic for scabies as it spares the finger webs and there are few linear lesions to  suggest burrows but is certainly possible. Thankfully, patient has been appropriately treated for this and will re-treat on day 7 with permethrin as prescribed. Discussed that if this is scabies, the itching can persist for several weeks after successful treatment. Will plan to treat symptomatically pending culture results and see her back in 2-3 weeks for follow up to ensure her symptoms are improving. If no improvement, would consider biopsy at that time.   - culture pending  - scabies prep negative  - triamcinolone cream 0.1% BID to areas of rash  - cetirizine 10 mg BID  - benadryl 25 mg qhs prn    RTC 2-3 weeks.    Discussed and examined with staff dermatologist Dr. Narciso Camarena MD  PGY-2, Dermatology    I have seen, evaluated and discussed the patient with resident physician. I have reviewed the resident physicians note and agree with their clinical findings, assessment and plan.  Appropriate changes to the resident's note have been made.    In addition, she was not to have scaling on her feet clinically consistent with tinea pedis. She was also given clotrimazole cream to use twice a day for two weeks.    Narciso Nath MD, FAAD    Departments of Internal Medicine and Dermatology  [facility]  [phone]

## 2017-05-06 LAB
BACTERIA SPEC CULT: NO GROWTH
Lab: NORMAL
MICRO REPORT STATUS: NORMAL
SPECIMEN SOURCE: NORMAL

## 2017-05-08 ENCOUNTER — TELEPHONE (OUTPATIENT)
Dept: DERMATOLOGY | Facility: CLINIC | Age: 37
End: 2017-05-08

## 2017-05-08 NOTE — TELEPHONE ENCOUNTER
Patient calling for culture results. Notes have not been placed by provider.    Routing to provider as a reminder.

## 2017-05-09 ENCOUNTER — TELEPHONE (OUTPATIENT)
Dept: DERMATOLOGY | Facility: CLINIC | Age: 37
End: 2017-05-09

## 2017-05-09 NOTE — TELEPHONE ENCOUNTER
Reached by phone to discuss cultures results. Explained negative bacterial culture, and that antibiotics are not indicated.    Over the course 25 minutes, she became increasingly upset, citing ongoing and possibly contagious lesions on her skin. Despite my repeated attempts to explain the differential diagnostic process (which, in this case, includes scabies and empiric treatment with permethrin cream x2, despite negative scabies prep, which is not 100% sensitive, and cannot definitively rule this diagnosis out), became exasperated at my attempts to confirm her medication regimen, profanely disparaged her care on 5/4/17 (and my clinical abilities) and demanded to speak with my supervisor, not allowing me to offer any additional counseling on her uncertain diagnosis (or empiric management options) prior to next follow-up.    Provider her with clinic supervisor contact info (which she already has) as requested.    Ian Wilson MD, LIV  PGY-4, Dermatology

## 2017-05-09 NOTE — TELEPHONE ENCOUNTER
DERMATOLOGY CLINIC FOLLOW-UP VISIT  5/4/17  Last visit: 2/3/17     DERMATOLOGY PROBLEM LIST  1. Psorasis, with possibly PsA: adalimumab 40 mg x1gnxax, calcipotriene betamethasone dipropionate 0.005-0.064 % oint BID PRN body, fluocinonide 0.05% solution scalp PRN  2. Pruritis NOS - resolved  3. Pruritic eruption     HPI: Ms. Moon is a 36 year old female who presents for evaluation of a rash. She reports a rash on her arms, legs, palms, hairline that began about 4 days ago. She notes the rash is pruritic on and off and is terribly bothersome. She was initially concerned that this could be an adverse reaction to her humira. She is not taking any oral medications for this. She saw another primary care doctor on Monday who thought it may be scabies and gave her a cream for this. (permethrin). She used this from the neck down as prescribed. She does have enough left for the second treatment. She notes the bumps look like pimples, some of them white on the top. She has scratched most of them to the point of scabbing. No fevers, chills, SOB, throat discomfort. She also reports some areas of psoriasis on the forearms, elbows but states that this new rash is different from her psoriasis. She is very bothered by the fact that she could have scabies. She notes a history of 'bad skin' and endorses picking at the bumps on her face.      She started humira in July 2016 for her psoriasis. Subsequently missed her follow up so humira was discontinued and then restarted 2/2017.      She is not taking any oral antihistamines or using topical steroids on this new rash as she wanted to know what it is prior to treating. Denies any recent travel or exposure to animals. Denies any household members with similar symptoms (lives with her children and her dad). Denies change in oral medications, topical products, outdoors exposures. Can not identify additional triggers.      ROS:   CONST: No f/c/ns  SKIN: No new/changing moles          Past Medical History         Past Medical History:   Diagnosis Date     Cannabis abuse, episodic       Last used 6 wks ago (3/30/07) per pt.     Other psoriasis and similar disorders       Tobacco use disorder       6 yrs 1 PPD             Current Outpatient Prescriptions           Current Outpatient Prescriptions   Medication Sig Dispense Refill     adalimumab (HUMIRA) 40 MG/0.8ML prefilled syringe kit Inject 0.8 mLs (40 mg) Subcutaneous every 14 days 12 Syringe 0     calcipotriene-betameth diprop (TACLONEX) 0.005-0.064 % OINT Apply topically twice daily to affected areas. Do not apply to the body folds. 100 g 5     fluocinonide (LIDEX) 0.05 % solution Apply topically daily to the scalp. 60 mL 5     adalimumab (HUMIRA) 40 MG/0.8ML pen kit Day 1: Inject 0.8 mLs (80 mg) subcutaneously. Day 7: Inject 0.4 mLs (40 mg); repeat every 14 days. 10 Syringe 0     calcipotriene-betameth diprop 0.005-0.064 % OINT Apply twice daily to affected areas on the trunk and and extremities was needed. 100 g 3     fluocinolone (SYNALAR) 0.01 % external solution APPLY TOPICALLY DAILY TO THE SCALP.   5     fluocinonide (LIDEX) 0.05 % ointment Apply twice daily to itchy areas on the legs and hands. Do not apply to the face or body folds. 60 g 3     hydrOXYzine (VISTARIL) 25 MG capsule Take 1-2 pills by mouth nightly as needed for itching. 90 capsule 3     hydrocortisone (WESTCORT) 0.2 % cream Apply topically 2 times daily Apply topically twice daily to the face and body folds. 60 g 5     levonorgestrel (MIRENA) 20 MCG/24HR IUD 1 each by Intrauterine route 1 each by Intrauterine route See Admin Instructions. To be administered once every 5 years in clinic office.                No Known Allergies     SH: Works in industrial cleaning.     PHYSICAL EXAMINATION:  There were no vitals taken for this visit.  Gen: Well-appearing, well-nourished no acute discomfort or distress.  SKIN: Full body examination of the scalp, face, ears, chest, back,  abdomen, bilateral upper and lower extremities, and nails significant for skin phototype type II, mild-to-moderate dermatoheliosis and:  - thin pink medium pink plaques with overlying white scale on the extensor elbows, dorsal forearms  - nail polish on all 10 fingernails  - face with excoriated papules, unable to assess well due to heavy makeup over these areas  - scattered medium pink 1-2 mm papules, some pustules, most excoriated with hemorrhagic crusting, some linear in array, some appearing to be follicularly based involving the wrist, ventral forearms, upper arms, bilateral thighs, lower legs, lower abdomen, posterior neck near hairline, fewer on the upper chest/shoulders  - palms with xerotic hyperkeratotic plaques centrally     LABS:   3/24/17:   LFTs WNL  CBC w/ diff WNL  TB quant gold negative  HepB surf Ag nonreactive, HepC Ab negative HIV non-reactive  **-- no HepB core Ab performed--        IMPRESSION AND PLAN:  1. Psoriasis, > 10% BSA possibly with PsA, on Humira -  - Continue adalimumab 40 mg Q 14 days  - continue calcipotriene betamethasone dipropionate ointment twice daily to trunk/extremities  - continue hydrocortisone 0.2% cream to face/folds  - continue fluocinonide 0.05% soln to the scalp     2. Pruritic eruption x 4-5 days. Highest on our differential at this time include scabies vs folliculitis vs DH vs neurotic excoriations vs other causes of pruritus--clinical exam is not totally classic for any of these. Culture obtained from one of the more pustular appearing lesions to evaluate for folliculitis. Scraping performed with mineral oil for scabies prep--no mites or scotoma appreciated. This is certainly not classic for scabies as it spares the finger webs and there are few linear lesions to suggest burrows but is certainly possible. Thankfully, patient has been appropriately treated for this and will re-treat on day 7 with permethrin as prescribed. Discussed that if this is scabies, the  itching can persist for several weeks after successful treatment. Will plan to treat symptomatically pending culture results and see her back in 2-3 weeks for follow up to ensure her symptoms are improving. If no improvement, would consider biopsy at that time.   - culture pending  - scabies prep negative  - triamcinolone cream 0.1% BID to areas of rash  - cetirizine 10 mg BID  - benadryl 25 mg qhs prn     RTC 2-3 weeks.     Discussed and examined with staff dermatologist Dr. Narciso Camarena MD  PGY-2, Dermatology

## 2017-05-09 NOTE — TELEPHONE ENCOUNTER
Pt called and is requesting results obtained by Dr. Nath at recent appt.  States she is very upset that no one as called and given these results.  Reported that he skin feels like it is burning and she is miserable.  Would like some one to call her back As Soon As Possible to give her results and some recommendations.  Message was sent to [facility] to review and call pt.

## 2017-05-09 NOTE — TELEPHONE ENCOUNTER
Called pt and left message that I would call back later this evening.     I called back later in the evening at 6:15pm. I told her my name and she asked if I was the same physician that called her earlier. After replying no and telling her I was the attending physician that saw her on Thursday, she got very angry and went on about how the clinic is no good since we moved and she is very disappointed in her care and she is finding another dermatologist and she would make a formal complaint. She was very upset that her culture was finalized on Saturday and she did not get a call back until today. She notes she has been suffering and had to go to urgent care. She then said good-bye and hung up on me before I had time to say anything else.

## 2017-06-24 ENCOUNTER — HEALTH MAINTENANCE LETTER (OUTPATIENT)
Age: 37
End: 2017-06-24

## 2017-07-18 ENCOUNTER — RESULT FOLLOW UP (OUTPATIENT)
Dept: MIDWIFE SERVICES | Facility: CLINIC | Age: 37
End: 2017-07-18

## 2017-07-18 ENCOUNTER — OFFICE VISIT (OUTPATIENT)
Dept: MIDWIFE SERVICES | Facility: CLINIC | Age: 37
End: 2017-07-18
Payer: COMMERCIAL

## 2017-07-18 VITALS
WEIGHT: 153.9 LBS | OXYGEN SATURATION: 98 % | HEART RATE: 123 BPM | SYSTOLIC BLOOD PRESSURE: 124 MMHG | TEMPERATURE: 98.5 F | DIASTOLIC BLOOD PRESSURE: 79 MMHG | HEIGHT: 63 IN | BODY MASS INDEX: 27.27 KG/M2

## 2017-07-18 DIAGNOSIS — Z11.51 SCREENING FOR HUMAN PAPILLOMAVIRUS: ICD-10-CM

## 2017-07-18 DIAGNOSIS — Z30.430 ENCOUNTER FOR IUD INSERTION: Primary | ICD-10-CM

## 2017-07-18 DIAGNOSIS — Z97.5 IUD (INTRAUTERINE DEVICE) IN PLACE: ICD-10-CM

## 2017-07-18 DIAGNOSIS — Z11.3 SCREEN FOR STD (SEXUALLY TRANSMITTED DISEASE): ICD-10-CM

## 2017-07-18 DIAGNOSIS — R87.810 ASCUS WITH POSITIVE HIGH RISK HPV CERVICAL: ICD-10-CM

## 2017-07-18 DIAGNOSIS — R87.610 ASCUS WITH POSITIVE HIGH RISK HPV CERVICAL: ICD-10-CM

## 2017-07-18 LAB — BETA HCG QUAL IFA URINE: NEGATIVE

## 2017-07-18 PROCEDURE — G0476 HPV COMBO ASSAY CA SCREEN: HCPCS | Performed by: ADVANCED PRACTICE MIDWIFE

## 2017-07-18 PROCEDURE — 87624 HPV HI-RISK TYP POOLED RSLT: CPT | Performed by: ADVANCED PRACTICE MIDWIFE

## 2017-07-18 PROCEDURE — 87491 CHLMYD TRACH DNA AMP PROBE: CPT | Performed by: ADVANCED PRACTICE MIDWIFE

## 2017-07-18 PROCEDURE — 84703 CHORIONIC GONADOTROPIN ASSAY: CPT | Performed by: ADVANCED PRACTICE MIDWIFE

## 2017-07-18 PROCEDURE — 87591 N.GONORRHOEAE DNA AMP PROB: CPT | Performed by: ADVANCED PRACTICE MIDWIFE

## 2017-07-18 PROCEDURE — 58300 INSERT INTRAUTERINE DEVICE: CPT | Performed by: ADVANCED PRACTICE MIDWIFE

## 2017-07-18 PROCEDURE — G0145 SCR C/V CYTO,THINLAYER,RESCR: HCPCS | Performed by: ADVANCED PRACTICE MIDWIFE

## 2017-07-18 NOTE — PATIENT INSTRUCTIONS

## 2017-07-18 NOTE — LETTER
October 9, 2018      Ryan Moon  [address]  [place] [address]    Dear ,      At [facility], your health and wellness is our primary concern. That is why we are following up on a colposcopy from 10/19/17. Your provider had recommended that you have a Pap smear and HPV test completed by 10/19/18. Our records do not show that this has been scheduled.    It is important to complete the follow up that your provider has suggested for you to ensure that there are no worsening changes which may, over time, develop into cancer.      Please contact our office at  [phone] to schedule an appointment for a Pap smear and HPV test at your earliest convenience. If you have questions or concerns, please call the clinic and we will be happy to assist you.    If you have completed the tests outside of [facility], please have the results forwarded to our office. We will update the chart for your primary Physician to review before your next annual physical.     Thank you for choosing [facility]!    Sincerely,      Ellen Ortiz CNM/oscar

## 2017-07-18 NOTE — NURSING NOTE
"Chief Complaint   Patient presents with     IUD     insertion, NDC 41882-025-44, LOT:[id number], EXP:2020     Gyn Exam     pap       Initial /79  Pulse 123  Temp 98.5  F (36.9  C) (Oral)  Ht 5' 3\" (1.6 m)  Wt 153 lb 14.4 oz (69.8 kg)  SpO2 98%  BMI 27.26 kg/m2 Estimated body mass index is 27.26 kg/(m^2) as calculated from the following:    Height as of this encounter: 5' 3\" (1.6 m).    Weight as of this encounter: 153 lb 14.4 oz (69.8 kg).  BP completed using cuff size: regular        The following HM Due: pap smear      The following patient reported/Care Every where data was sent to:  P ABSTRACT QUALITY INITIATIVES [94030]  none     patient has appointment for today and orders have been placed             "

## 2017-07-18 NOTE — MR AVS SNAPSHOT
After Visit Summary   7/18/2017    Ryan Moon    MRN: [id number]           Patient Information     Date Of Birth          1980        Visit Information        Provider Department      7/18/2017 11:45 AM Ellen Ortiz CNM [facility]        Today's Diagnoses     Encounter for IUD insertion    -  1    Screening for human papillomavirus        Screen for STD (sexually transmitted disease)          Care Instructions    What Mirena Users May Expect    What to watch for right after Mirena is placed  Some women may experience uterine cramps, bleeding, and/or dizziness during and right after Mirena is placed. To help minimize the cramps, you may taken ibuprofen 600 mg with food prior to your appointment. These symptoms should improve over the next 24 hours.  Mild cramping may be present for a few days after your placement  As a follow up, you should check your strings on 4 weeks or visit your clinic once in the first 4 to 12 weeks after Mirena is placed to make sure it is in the right position. After that, Mirena can be checked once a year as part of your routine exam.    Please use a back-up method (abstinence or condoms) for 5 days after placement.    Your periods may change  For the first 3 to 6 months, your monthly period may become irregular. You may also have frequent spotting or light bleeding. A few women have heavy bleeding during this time. After your body adjusts, the number of bleeding days is likely to decrease (but may remain irregular), and you may even find that your periods stop altogether for as long as Mirena is in place. Around the end of the third month of use, you may see up to a 75% reduction in the amount of menstrual bleeding. By one year, about 1 out of 5 users may hay have no period at all. At the end of two years, 70% have little or no bleeding. Your periods will return rapidly once Mirena is removed.     Mirena Strings  You may check your  own Mirena strings by inserting a finger into the vagina and feeling the strings as they exit the cervix.  The strings will initially feel firm, like fishing line, but will soften over a few weeks.  After the strings have softened, you or your partner should not be able to feel the strings during intercourse.  If you can feel the IUD, see your healthcare provider to have the position confirmed.  You may use tampons with Mirena in place.    Mirena does not protect against HIV or STDs.  Mirena does not prevent the formation of ovarian cysts.  Mirena does not typically reduce acne or cause weight gain or mood changes.    Please call [facility] at [phone] if you have questions or concerns.    For more information:  [email or web address]            Follow-ups after your visit        Who to contact     If you have questions or need follow up information about today's clinic visit or your schedule please contact [facility] directly at [phone].  Normal or non-critical lab and imaging results will be communicated to you by Supersonichart, letter or phone within 4 business days after the clinic has received the results. If you do not hear from us within 7 days, please contact the clinic through Medstoryt or phone. If you have a critical or abnormal lab result, we will notify you by phone as soon as possible.  Submit refill requests through Gecko or call your pharmacy and they will forward the refill request to us. Please allow 3 business days for your refill to be completed.          Additional Information About Your Visit        Gecko Information     Gecko gives you secure access to your electronic health record. If you see a primary care provider, you can also send messages to your care team and make appointments. If you have questions, please call your primary care clinic.  If you do not have a primary care provider, please call [phone] and they will assist you.       "  Care EveryWhere ID     This is your Care EveryWhere ID. This could be used by other organizations to access your [facility] medical records  [id number]        Your Vitals Were     Pulse Temperature Height Pulse Oximetry BMI (Body Mass Index)       123 98.5  F (36.9  C) (Oral) 5' 3\" (1.6 m) 98% 27.26 kg/m2        Blood Pressure from Last 3 Encounters:   07/18/17 124/79   03/17/14 136/88   01/31/13 133/86    Weight from Last 3 Encounters:   07/18/17 153 lb 14.4 oz (69.8 kg)   03/17/14 138 lb 1.6 oz (62.6 kg)   01/31/13 138 lb 11.2 oz (62.9 kg)              We Performed the Following     Beta HCG qual IFA urine        Primary Care Provider Fax #    [facility] [phone] [address] [facility] [address]        Equal Access to Services     JOSE EMERSON : Hadii yg ku radhao Sophillip, waaxda luqadaha, qaybta kaalmada adeegyada, ritchie bowie . So [facility] [phone].    ATENCIÓN: Si habla español, tiene a jimenez disposición servicios gratuitos de asistencia lingüística. Llame al [phone].    We comply with applicable federal civil rights laws and [place] laws. We do not discriminate on the basis of race, color, national origin, age, disability sex, sexual orientation or gender identity.            Thank you!     Thank you for choosing [facility]  for your care. Our goal is always to provide you with excellent care. Hearing back from our patients is one way we can continue to improve our services. Please take a few minutes to complete the written survey that you may receive in the mail after your visit with us. Thank you!             Your Updated Medication List - Protect others around you: Learn how to safely use, store and throw away your medicines at www.disposemymeds.org.          This list is accurate as of: 7/18/17 12:27 PM.  Always use your most recent med list.                   Brand Name Dispense Instructions for use Diagnosis "    clotrimazole 1 % cream    LOTRIMIN    30 g    Apply topically 2 times daily    Tinea pedis of both feet       fluocinolone 0.01 % solution    SYNALAR     APPLY TOPICALLY DAILY TO THE SCALP.

## 2017-07-18 NOTE — LETTER
October 25, 2019      Ryan Moon  [address]  [place] [address]    Dear ,      This letter is to remind you that you are due for your follow up PAP smear on or about 11/08/19.    Please call [phone] to schedule your appointment at your earliest convenience.     If you have completed the tests outside of [facility], please have the results forwarded to our office. We will update the chart for your primary Physician to review before your next annual physical.     Sincerely,      Your [facility] Care Team//[facility]

## 2017-07-18 NOTE — PROGRESS NOTES
INDICATIONS:                                                      Is a pregnancy test required: Yes.  Was it positive or negative?  Negative  Was a consent obtained?  Yes    Ryan Moon is a 36 year old female,   who presents for insertion of an IUD. Hers was removed in the hospital when she had a serious infection of unknown origin.  The risks, benefits and alternatives of IUD insertion were discussed in detail previously. She also has reviewed the product brochure.  She has elected to go ahead with the insertion  today and her questions were answered. Consent was signed. Her LMP was normal in duration and amount of flow. A pregnancy test was performed today:  Yes    She is also due for a pap and would like to be checked for GC/CT.    Today's PHQ-2 Score:   PHQ-2 (  [employer]) 2013   Q1: Little interest or pleasure in doing things 0   Q2: Feeling down, depressed or hopeless 0   PHQ-2 Score 0       PROCEDURE:                                                      Speculum was placed and pap and GC/CT were collected.     The pelvic exam revealed normal external genitalia. On bimanual exam the uterus was Anteverted and Midposition and normal in size with no tenderness present. A speculum was inserted into the vagina and the cervix was visualized. The cervix was prepped with Betadine . The anterior lip of the cervix was grasped with a single toothed tenaculum. The uterus sounded to 7 cm. A Mirena IUD was then inserted without difficulty. The string was cut to 3 cm.  The patient experienced a mild amount of cramping.    POST PROCEDURE:                                                      She  tolerated the procedure well. There were no complications. Patient was discharged in stable condition.    Return to clinic in 2-5 weeks for IUD check.  Call if severe cramping, fever, abnormal bleeding, abnormal discharge or pelvic pain develop.  Patient was counseled about the chance of irregular  bleeding.    We will contact her with any abnormal results.      Ellen Ortiz CNM

## 2017-07-19 LAB
C TRACH DNA SPEC QL NAA+PROBE: NORMAL
N GONORRHOEA DNA SPEC QL NAA+PROBE: NORMAL
SPECIMEN SOURCE: NORMAL
SPECIMEN SOURCE: NORMAL

## 2017-07-26 LAB
COPATH REPORT: ABNORMAL
PAP: ABNORMAL

## 2017-07-27 LAB
FINAL DIAGNOSIS: ABNORMAL
HPV HR 12 DNA CVX QL NAA+PROBE: POSITIVE
HPV16 DNA SPEC QL NAA+PROBE: NEGATIVE
HPV18 DNA SPEC QL NAA+PROBE: NEGATIVE
SPECIMEN DESCRIPTION: ABNORMAL

## 2017-07-28 NOTE — PROGRESS NOTES
07/18/17: Asc[facility] pap, + HR HPV (not 16 or 18 ) result. Plan Nickerson due by 10/18/17.  07/31/17: I called the pt and left a message for the pt to call me back.  08/10/17 Patient notified of results and recommendations for colposcopy. ([facility])  10/19/17: Nickerson Bx benign. Plan cotest in 1 year. Pt was advised.  10/9/18 Cotest reminder letter sent (rl)  11/2/18 Pap Follow up reminder call placed ([facility])  11/8/18 NIL pap, Neg HPV. Plan pap in one year per providers note. ([facility])  11/19/18: Pt viewed [employer] message.  10/25/19 Pap reminder letter sent. ([facility])  12/19/19 [facility] clinic and schedule. ([facility])  01/20/20 Patient is lost to pap tracking follow-up. FYI routed to provider. ([facility])

## 2017-10-19 ENCOUNTER — OFFICE VISIT (OUTPATIENT)
Dept: OBGYN | Facility: CLINIC | Age: 37
End: 2017-10-19
Payer: COMMERCIAL

## 2017-10-19 VITALS
SYSTOLIC BLOOD PRESSURE: 120 MMHG | BODY MASS INDEX: 27.81 KG/M2 | OXYGEN SATURATION: 100 % | DIASTOLIC BLOOD PRESSURE: 76 MMHG | WEIGHT: 157 LBS | HEART RATE: 110 BPM

## 2017-10-19 DIAGNOSIS — R87.610 ASCUS WITH POSITIVE HIGH RISK HPV CERVICAL: Primary | ICD-10-CM

## 2017-10-19 DIAGNOSIS — R87.810 ASCUS WITH POSITIVE HIGH RISK HPV CERVICAL: Primary | ICD-10-CM

## 2017-10-19 LAB — BETA HCG QUAL IFA URINE: NEGATIVE

## 2017-10-19 PROCEDURE — 84703 CHORIONIC GONADOTROPIN ASSAY: CPT | Performed by: OBSTETRICS & GYNECOLOGY

## 2017-10-19 PROCEDURE — 99212 OFFICE O/P EST SF 10 MIN: CPT | Mod: 25 | Performed by: OBSTETRICS & GYNECOLOGY

## 2017-10-19 PROCEDURE — 57454 BX/CURETT OF CERVIX W/SCOPE: CPT | Performed by: OBSTETRICS & GYNECOLOGY

## 2017-10-19 PROCEDURE — 88305 TISSUE EXAM BY PATHOLOGIST: CPT | Mod: 26 | Performed by: OBSTETRICS & GYNECOLOGY

## 2017-10-19 PROCEDURE — 88305 TISSUE EXAM BY PATHOLOGIST: CPT | Performed by: OBSTETRICS & GYNECOLOGY

## 2017-10-19 NOTE — MR AVS SNAPSHOT
After Visit Summary   10/19/2017    Ryan Moon    MRN: [id number]           Patient Information     Date Of Birth          1980        Visit Information        Provider Department      10/19/2017 12:00 PM Peg Ayala MD; RD PROC [facility]        Today's Diagnoses     Keller for ASCUS, other HPV +    -  1      Care Instructions    Colposcopy Post-Procedure Patient Instructions      You may experience any of the following for a couple of days following colposcopy:    Mild cramping    Vaginal bleeding     Vaginal discharge that looks black and clumpy    Please call your healthcare provider if you have any of the following symptoms:    Fever--Temperature greater than 100 degrees    Cramping after 48 hours    Bleeding heavier than a normal period in the first 24-48 hours    If you are bleeding and soaking more than one pad an hour    Or any other worrisome problems.    We recommend that:    You use pads, not tampons for the bleeding.    You may resume sexual activity in 2-3 days, or after bleeding stops.    You may use Tylenol or ibuprofen (Motrin or Advil) for any discomfort.      [facility] : [phone]            Follow-ups after your visit        Who to contact     If you have questions or need follow up information about today's clinic visit or your schedule please contact [facility] directly at [phone].  Normal or non-critical lab and imaging results will be communicated to you by MyChart, letter or phone within 4 business days after the clinic has received the results. If you do not hear from us within 7 days, please contact the clinic through MyChart or phone. If you have a critical or abnormal lab result, we will notify you by phone as soon as possible.  Submit refill requests through [employer] or call your pharmacy and they will forward the refill request to us. Please allow 3 business days for your refill to be  completed.          Additional Information About Your Visit        DubMeNowhart Information     [employer] gives you secure access to your electronic health record. If you see a primary care provider, you can also send messages to your care team and make appointments. If you have questions, please call your primary care clinic.  If you do not have a primary care provider, please call [phone] and they will assist you.        Care EveryWhere ID     This is your Care EveryWhere ID. This could be used by other organizations to access your [place] medical records  [id number]        Your Vitals Were     Pulse Pulse Oximetry Breastfeeding? BMI (Body Mass Index)          110 100% No 27.81 kg/m2         Blood Pressure from Last 3 Encounters:   10/19/17 120/76   07/18/17 124/79   03/17/14 136/88    Weight from Last 3 Encounters:   10/19/17 157 lb (71.2 kg)   07/18/17 153 lb 14.4 oz (69.8 kg)   03/17/14 138 lb 1.6 oz (62.6 kg)              We Performed the Following     Beta HCG qual IFA urine     COLP CERVIX/UPPER VAGINA W BX CERVIX/ENDOCERV CURETT     Surgical pathology exam        Primary Care Provider Fax #    [facility] [phone] [address] [facility] [address]        Equal Access to Services     JOSE EMERSON : Hadii aad ku hadasho Soomaali, waaxda luqadaha, qaybta kaalmada adeegyada, waxay dodiein hayritan casper peacock. So [facility] [phone].    ATENCIÓN: Si habla español, tiene a jimenez disposición servicios gratuitos de asistencia lingüística. Llame al [phone].    We comply with applicable federal civil rights laws and [place] laws. We do not discriminate on the basis of race, color, national origin, age, disability, sex, sexual orientation, or gender identity.            Thank you!     Thank you for choosing [facility]  for your care. Our goal is always to provide you with excellent care. Hearing back from our patients is one way we can continue to  improve our services. Please take a few minutes to complete the written survey that you may receive in the mail after your visit with us. Thank you!             Your Updated Medication List - Protect others around you: Learn how to safely use, store and throw away your medicines at www.disposemymeds.org.          This list is accurate as of: 10/19/17  1:57 PM.  Always use your most recent med list.                   Brand Name Dispense Instructions for use Diagnosis    clotrimazole 1 % cream    LOTRIMIN    30 g    Apply topically 2 times daily    Tinea pedis of both feet       fluocinolone 0.01 % solution    SYNALAR     APPLY TOPICALLY DAILY TO THE SCALP.        levonorgestrel 20 MCG/24HR IUD    MIRENA    1 each    1 each (20 mcg) by Intrauterine route once for 1 dose    Encounter for IUD insertion

## 2017-10-19 NOTE — PROGRESS NOTES
INDICATION: Ryan Moon is a 37 year old female who presents for colposcopy.  Pap smear was reported as ASCUS, other HPV +.   We discussed cervical dysplasia, HPV, abnormal pap.  She had a prolonged [place] at [facility] [place], treated for sepsis, wonders about immune suppression and HPV.   Informed consent obtained for procedure.               COLPOSCOPIC EXAM:  Satisfactory  IUD string present    Using standard technique and acetic acid application, the cervix and vagina were examined using the colposcope.  The transformation zone appeared abnormal, with minimal white epithelium most prominent to the left posterior, and representative biopsy of 5:00 was sent.  ECC was performed.   Monsels applied for hemostasis.    Colposcopic Impression: ASCUS pap.  Other HPV +.  Possible dysplasia.     PLAN: Post Colposcopy Instructions were given.  Call in 1 week for biopsy results.  Will advise followup depending on results.      In addition to the procedure, I spent 10 minutes with the patient, with more than 50% spent in counseling/discussing the diagnosis and treatment options.

## 2017-10-19 NOTE — NURSING NOTE
"Chief Complaint   Patient presents with     Colposcopy       Initial /76  Pulse 110  Wt 157 lb (71.2 kg)  SpO2 100%  Breastfeeding? No  BMI 27.81 kg/m2 Estimated body mass index is 27.81 kg/(m^2) as calculated from the following:    Height as of 17: 5' 3\" (1.6 m).    Weight as of this encounter: 157 lb (71.2 kg).  BP completed using cuff size: regular        The following HM Due: NONE      The following patient reported/Care Every where data was sent to:  P ABSTRACT QUALITY INITIATIVES [28707]  none     n/a             "

## 2017-10-19 NOTE — PATIENT INSTRUCTIONS

## 2017-10-24 LAB — COPATH REPORT: NORMAL

## 2018-11-02 ENCOUNTER — TELEPHONE (OUTPATIENT)
Dept: OBGYN | Facility: CLINIC | Age: 38
End: 2018-11-02

## 2018-11-02 NOTE — TELEPHONE ENCOUNTER
Pt is past due for Pap follow up  Reminder letter has been sent  LMTC her clinic with any questions or to schedule    Tamika Trujillo,   Pap Tracking

## 2018-11-08 ENCOUNTER — OFFICE VISIT (OUTPATIENT)
Dept: OBGYN | Facility: CLINIC | Age: 38
End: 2018-11-08
Payer: COMMERCIAL

## 2018-11-08 VITALS
BODY MASS INDEX: 27.46 KG/M2 | SYSTOLIC BLOOD PRESSURE: 145 MMHG | WEIGHT: 155 LBS | HEIGHT: 63 IN | HEART RATE: 115 BPM | DIASTOLIC BLOOD PRESSURE: 90 MMHG

## 2018-11-08 DIAGNOSIS — Z11.3 SCREEN FOR STD (SEXUALLY TRANSMITTED DISEASE): ICD-10-CM

## 2018-11-08 DIAGNOSIS — Z12.4 SCREENING FOR MALIGNANT NEOPLASM OF CERVIX: Primary | ICD-10-CM

## 2018-11-08 LAB — BETA HCG QUAL IFA URINE: NEGATIVE

## 2018-11-08 PROCEDURE — 87624 HPV HI-RISK TYP POOLED RSLT: CPT | Performed by: OBSTETRICS & GYNECOLOGY

## 2018-11-08 PROCEDURE — 99213 OFFICE O/P EST LOW 20 MIN: CPT | Performed by: OBSTETRICS & GYNECOLOGY

## 2018-11-08 PROCEDURE — 87591 N.GONORRHOEAE DNA AMP PROB: CPT | Performed by: OBSTETRICS & GYNECOLOGY

## 2018-11-08 PROCEDURE — G0145 SCR C/V CYTO,THINLAYER,RESCR: HCPCS | Performed by: OBSTETRICS & GYNECOLOGY

## 2018-11-08 PROCEDURE — G0476 HPV COMBO ASSAY CA SCREEN: HCPCS | Performed by: OBSTETRICS & GYNECOLOGY

## 2018-11-08 PROCEDURE — 84703 CHORIONIC GONADOTROPIN ASSAY: CPT | Performed by: OBSTETRICS & GYNECOLOGY

## 2018-11-08 PROCEDURE — 87491 CHLMYD TRACH DNA AMP PROBE: CPT | Performed by: OBSTETRICS & GYNECOLOGY

## 2018-11-08 NOTE — PROGRESS NOTES
Ryan Moon is a 38 year old  who presents for repeat pap.  See problem list, prior colp.  She is amenorrheic with the Mirena, placed 2017.  She is having some problems with carpal tunnel and anticipates possible surgery.  She has no abdominal or pelvic pain, no abnormal discharge.  She requests GC/CT test.    Past Medical History:   Diagnosis Date     ASCUS with positive high risk HPV cervical 2017: Ascus pap, + HR HPV (not 16 or 18 ) result.      Cannabis abuse, episodic     Last used 6 wks ago (3/30/07) per pt.     Other psoriasis and similar disorders      Tobacco use disorder     6 yrs   1 PPD       Past Surgical History:   Procedure Laterality Date     HC REMOVAL GALLBLADDER      after 1st preg       Family History   Problem Relation Age of Onset     Hypertension Paternal Grandmother      Diabetes Paternal Grandmother      type 2     Alcohol/Drug Paternal Grandmother      ETOH     Alcohol/Drug Father      ETOH     GASTROINTESTINAL DISEASE Paternal Grandfather      Cirrhosis     Cancer No family hx of      No known family hx of skin cancer     Melanoma No family hx of      Skin Cancer No family hx of        Social History     Social History     Marital status: Single     Spouse name: Mitzy     Number of children: 1     Years of education: 10     Occupational History     UNEMPLOYED      Social History Main Topics     Smoking status: Current Some Day Smoker     Packs/day: 0.50     Years: 5.00     Types: Cigarettes     Smokeless tobacco: Former User      Comment: 0.5-1 PPD     Alcohol use 3.0 - 3.6 oz/week     2 Standard drinks or equivalent, 3 - 4 Shots of liquor per week     Drug use: No     Sexual activity: Yes     Partners: Male     Birth control/ protection: IUD      Comment: Mirena     Other Topics Concern     None     Social History Narrative    Caffeine intake/servings daily - 3    Calcium intake/servings daily - 5    Exercise 0 times weekly - describe none     "Sunscreen used - No    Seatbelts used - Yes    Guns stored in the home - No    Self Breast Exam - Yes    Pap test up to date -  Yes today    Eye exam up to date -  Yes    Dental exam up to date -  Yes    DEXA scan up to date -  Not Applicable    Flex Sig/Colonoscopy up to date -  Not Applicable    Mammography up to date -  Not Applicable    Immunizations reviewed and up to date - No     Abuse: Current or Past (Physical, Sexual or Emotional) - No    Do you feel safe in your environment - Yes    Do you cope well with stress - Yes    Do you suffer from insomnia - No    Last updated by: Melany Mendiola MA December 20, 2011                                   ROS:   issues include the above described lack of bleeding.  No abnormal discharge.  No dysuria, hematuria.  GI issues include the above described lack of pain.   No nausea, vomiting.  Consitutional:  she denies fever.  skin:  she denies lesions.  Allergies are documented.      OBJECTIVE:  General appearance was that of a healthy, well-nourished woman in no distress.    /90  Pulse 115  Ht 5' 3\" (1.6 m)  Wt 155 lb (70.3 kg)  Breastfeeding? No  BMI 27.46 kg/m2  Abdomen was soft, non-tender, without mass or hernia.    Neuro/psych exam revealed appropriate mood and affect.      PELVIC:  External genitalia were normal.  Urethral meatus, urethra, bladder, perineum, and anus were normal.  Cervix and vagina were normal.  IUD string present.  Pap sent.  Bimanual exam revealed a normal, non tender uterus and no adnexal masses or tenderness.    IMP: Normal pelvic exam.    PLAN: If Pap and HPV are negative, anticipate repeat Pap in 1 year.   "

## 2018-11-08 NOTE — MR AVS SNAPSHOT
"              After Visit Summary   11/8/2018    Ryan Moon    MRN: [id number]           Patient Information     Date Of Birth          1980        Visit Information        Provider Department      11/8/2018 2:00 PM [place]ala [place] [facility]        Today's Diagnoses     Screening for malignant neoplasm of cervix    -  1    Screen for STD (sexually transmitted disease)           Follow-ups after your visit        Who to contact     If you have questions or need follow up information about today's clinic visit or your schedule please contact [facility] directly at [phone].  Normal or non-critical lab and imaging results will be communicated to you by KISSmetricshart, letter or phone within 4 business days after the clinic has received the results. If you do not hear from us within 7 days, please contact the clinic through KISSmetricshart or phone. If you have a critical or abnormal lab result, we will notify you by phone as soon as possible.  Submit refill requests through FORVM or call your pharmacy and they will forward the refill request to us. Please allow 3 business days for your refill to be completed.          Additional Information About Your Visit        MyChart Information     [employer] gives you secure access to your electronic health record. If you see a primary care provider, you can also send messages to your care team and make appointments. If you have questions, please call your primary care clinic.  If you do not have a primary care provider, please call [phone] and they will assist you.        Care EveryWhere ID     This is your Care EveryWhere ID. This could be used by other organizations to access your [place] medical records  [id number]        Your Vitals Were     Pulse Height Breastfeeding? BMI (Body Mass Index)          115 5' 3\" (1.6 m) No 27.46 kg/m2         Blood Pressure from Last 3 Encounters:   11/08/18 145/90   10/19/17 120/76   07/18/17 " 124/79    Weight from Last 3 Encounters:   11/08/18 155 lb (70.3 kg)   10/19/17 157 lb (71.2 kg)   07/18/17 153 lb 14.4 oz (69.8 kg)              We Performed the Following     Beta HCG qual IFA urine     CHLAMYDIA TRACHOMATIS PCR     HPV High Risk Types DNA Cervical     NEISSERIA GONORRHOEA PCR     Pap imaged thin layer screen with HPV - recommended age 30 - 65 years (select HPV order below)        Primary Care Provider Office Phone # Fax #    [facility] [phone] [address] [facility] [address]        Equal Access to Services     GLYNN EMERSON : Hadii aad ku hadasho Soomaali, waaxda luqadaha, qaybta kaalmada adeshanayatank, ritchie bowie . So [facility] [phone].    ATENCIÓN: Si habla español, tiene a jimenez disposición servicios gratuitos de asistencia lingüística. Llame al [phone].    We comply with applicable federal civil rights laws and [place] laws. We do not discriminate on the basis of race, color, national origin, age, disability, sex, sexual orientation, or gender identity.            Thank you!     Thank you for choosing [facility]  for your care. Our goal is always to provide you with excellent care. Hearing back from our patients is one way we can continue to improve our services. Please take a few minutes to complete the written survey that you may receive in the mail after your visit with us. Thank you!             Your Updated Medication List - Protect others around you: Learn how to safely use, store and throw away your medicines at www.disposemymeds.org.          This list is accurate as of 11/8/18  2:44 PM.  Always use your most recent med list.                   Brand Name Dispense Instructions for use Diagnosis    clotrimazole 1 % cream    LOTRIMIN    30 g    Apply topically 2 times daily    Tinea pedis of both feet       fluocinolone 0.01 % solution    SYNALAR     APPLY TOPICALLY DAILY TO THE SCALP.         levonorgestrel 20 MCG/24HR IUD    MIRENA    1 each    1 each (20 mcg) by Intrauterine route once for 1 dose    Encounter for IUD insertion

## 2018-11-09 LAB
C TRACH DNA SPEC QL NAA+PROBE: NEGATIVE
N GONORRHOEA DNA SPEC QL NAA+PROBE: NEGATIVE
SPECIMEN SOURCE: NORMAL
SPECIMEN SOURCE: NORMAL

## 2018-11-12 LAB
COPATH REPORT: NORMAL
PAP: NORMAL

## 2018-11-16 LAB
FINAL DIAGNOSIS: NORMAL
HPV HR 12 DNA CVX QL NAA+PROBE: NEGATIVE
HPV16 DNA SPEC QL NAA+PROBE: NEGATIVE
HPV18 DNA SPEC QL NAA+PROBE: NEGATIVE
SPECIMEN DESCRIPTION: NORMAL
SPECIMEN SOURCE CVX/VAG CYTO: NORMAL

## 2019-10-02 ENCOUNTER — HEALTH MAINTENANCE LETTER (OUTPATIENT)
Age: 39
End: 2019-10-02

## 2019-11-25 ENCOUNTER — OFFICE VISIT (OUTPATIENT)
Dept: OBGYN | Facility: CLINIC | Age: 39
End: 2019-11-25
Payer: COMMERCIAL

## 2019-11-25 VITALS
HEART RATE: 112 BPM | WEIGHT: 165 LBS | SYSTOLIC BLOOD PRESSURE: 141 MMHG | DIASTOLIC BLOOD PRESSURE: 94 MMHG | BODY MASS INDEX: 29.23 KG/M2

## 2019-11-25 DIAGNOSIS — Z13.1 SCREENING FOR DIABETES MELLITUS: ICD-10-CM

## 2019-11-25 DIAGNOSIS — Z01.419 WELL WOMAN EXAM WITH ROUTINE GYNECOLOGICAL EXAM: Primary | ICD-10-CM

## 2019-11-25 DIAGNOSIS — Z13.220 SCREENING CHOLESTEROL LEVEL: ICD-10-CM

## 2019-11-25 DIAGNOSIS — R03.0 ELEVATED BLOOD PRESSURE READING WITHOUT DIAGNOSIS OF HYPERTENSION: ICD-10-CM

## 2019-11-25 DIAGNOSIS — Z11.3 SCREEN FOR STD (SEXUALLY TRANSMITTED DISEASE): ICD-10-CM

## 2019-11-25 DIAGNOSIS — Z71.6 ENCOUNTER FOR SMOKING CESSATION COUNSELING: ICD-10-CM

## 2019-11-25 DIAGNOSIS — E66.3 OVERWEIGHT (BMI 25.0-29.9): ICD-10-CM

## 2019-11-25 LAB — HBA1C MFR BLD: 5.1 % (ref 0–5.6)

## 2019-11-25 PROCEDURE — 99395 PREV VISIT EST AGE 18-39: CPT | Performed by: OBSTETRICS & GYNECOLOGY

## 2019-11-25 PROCEDURE — 83036 HEMOGLOBIN GLYCOSYLATED A1C: CPT | Performed by: OBSTETRICS & GYNECOLOGY

## 2019-11-25 PROCEDURE — 87491 CHLMYD TRACH DNA AMP PROBE: CPT | Performed by: OBSTETRICS & GYNECOLOGY

## 2019-11-25 PROCEDURE — 87591 N.GONORRHOEAE DNA AMP PROB: CPT | Performed by: OBSTETRICS & GYNECOLOGY

## 2019-11-25 PROCEDURE — 36415 COLL VENOUS BLD VENIPUNCTURE: CPT | Performed by: OBSTETRICS & GYNECOLOGY

## 2019-11-25 NOTE — PATIENT INSTRUCTIONS
Patient Education     Prevention Guidelines, Women Ages 18 to 39  Screening tests and vaccines are an important part of managing your health. A screening test is done to find possible disorders or diseases in people who don't have any symptoms. The goal is to find a disease early so lifestyle changes can be made and you can be watched more closely to reduce the risk of disease, or to detect it early enough to treat it most effectively. Screening tests are not considered diagnostic, but are used to determine if more testing is needed. Health counseling is essential, too. Below are guidelines for these, for women ages 18 to 39. Talk with your healthcare provider to make sure you re up-to-date on what you need.  Screening Who needs it How often   Alcohol misuse All women in this age group At routine exams   Blood pressure All women in this age group Yearly checkup if your blood pressure is normal  Normal blood pressure is less than 120/80 mm Hg  If your blood pressure reading is higher than normal, follow the advice of your healthcare provider   Breast cancer All women in this age group should talk with their healthcare providers about the need for clinical breast exams (CBE)1 Clinical breast exam every 3 years1   Cervical cancer Women ages 21 and older Women between ages 21 and 29 should have a Pap test every 3 years; women between ages 30 and 65 are advised to have a Pap test plus an HPV test every 5 years   Chlamydia Sexually active women ages 25 and younger, and women at increased risk for infection (such as having multiple sex partners) Every year if you're at risk or have symptoms   Depression All women in this age group At routine exams   Type 2 diabetes, prediabetes All women with no symptoms who are overweight or obese and have 1 or more other risk factors for diabetes At least every 3 years. Also, testing for diabetes during pregnancy after the 24th week.    Type 2 diabetes, prediabetes All women diagnosed  with gestational diabetes Lifelong testing every 3 years   Type 2 diabetes All women with prediabetes Every year   Gonorrhea Sexually active women at increased risk for infection At routine exams   Hepatitis C Anyone at increased risk At routine exams   HIV All women should be tested at least once for HIV between the ages of 13 and 64 At routine exams. Those with risk factors for HIV should be tested at least annually.    Obesity All women in this age group At routine exams   Syphilis Women at increased risk for infection should talk with their healthcare provider At routine exams   Tuberculosis Women at increased risk for infection should talk with their healthcare provider Ask your healthcare provider   Vision All women in this age group At least 1 complete exam in your 20s, and 2 in your 30s   Vaccine2 Who needs it How often   Chickenpox (varicella) All women in this age group who have no record of this infection or vaccine 2 doses; the second dose should be given 4 to 8 weeks after the first dose   Hepatitis A Women at increased risk for infection should talk with their healthcare provider 2 doses given at least 6 months apart   Hepatitis B Women at increased risk for infection should talk with their healthcare provider 3 doses over 6 months; second dose should be given 1 month after the first dose; the third dose should be given at least 2 months after the second dose and at least 4 months after the first dose   Haemophilus influenzae Type B (HIB) Women at increased risk for infection should talk with their healthcare provider 1 to 3 doses   Human papillomavirus (HPV) All women in this age group up to age 26 3 doses; the second dose should be given 1 to 2 months after the first dose and the third dose given 6 months after the first dose   Influenza (flu) All women in this age group Once a year   Measles, mumps, rubella (MMR) All women in this age group who have no record of these infections or vaccines 1 or 2  doses   Meningococcal Women at increased risk for infection should talk with their healthcare provider 1 or more doses   Pneumococcal conjugate vaccine (PCV13) and pneumococcal polysaccharide vaccine (PPSV23) Women at increased risk for infection should talk with their healthcare provider PCV13: 1 dose ages 19 to 65 (protects against 13 types of pneumococcal bacteria)  PPSV23: 1 to 2 doses through age 64, or 1 dose at 65 or older (protects against 23 types of pneumococcal bacteria)      Tetanus/diphtheria/pertussis (Td/Tdap) booster All women in this age group Td every 10 years, or a one-time dose of Tdap instead of a Td booster after age 18, then Td every 10 years   Counseling Who needs it How often   BRCA gene mutation testing for breast and ovarian cancer susceptibility Women with increased risk for having gene mutation When your risk is known   Breast cancer and chemoprevention Women at high risk for breast cancer When your risk is known   Diet and exercise Women who are overweight or obese When diagnosed, and then at routine exams   Domestic violence Women at the age in which they are able to have children At routine exams   Sexually transmitted infection prevention Women who are sexually active At routine exams   Skin cancer Prevention of skin cancer in fair-skinned adults At routine exams   Use of tobacco and the health effects it can cause All women in this age group Every visit   1 According to the ACS, women ages 20 to 39 years should have a clinical breast exam (CBE) as part of their routine health exam every 3 years. Breast self-exams are an option for women starting in their 20s. But the USPSTF does not recommend CBE.  Date Last Reviewed: 10/1/2017    1114-6469 The "[employer]. [address] [place] [address], [place] [address]. All rights reserved. This information is not intended as a substitute for professional medical care. Always follow your healthcare professional's instructions.

## 2019-11-25 NOTE — NURSING NOTE
"Chief Complaint   Patient presents with     Physical       Initial BP (!) 141/94   Pulse 112   Wt 74.8 kg (165 lb)   BMI 29.23 kg/m   Estimated body mass index is 29.23 kg/m  as calculated from the following:    Height as of 18: 1.6 m (5' 3\").    Weight as of this encounter: 74.8 kg (165 lb).  BP completed using cuff size: regular    Questioned patient about current smoking habits.  Pt. has never smoked.          The following HM Due: NONE      The following patient reported/Care Every where data was sent to:  P ABSTRACT QUALITY INITIATIVES [78055]        N/a      Corinne Narayan MA                  "

## 2019-11-25 NOTE — PROGRESS NOTES
[facility]    Ryan is a 39 year old  female who presents for annual exam. She states that she has no other concerns today.    Menses.  No LMP recorded. (Menstrual status: IUD).. Using IUD for contraception.  She is not currently considering pregnancy.  Besides routine health maintenance, she has no other health concerns today .  GYNECOLOGIC HISTORY:  Menarche: 16  Ryan is sexually active with 1 male partner(s) and is currently in monogamous relationship with boyfriend.    History sexually transmitted infections:No STD history  STI testing offered?  Accepted  EDISON exposure: Unknown  History of abnormal Pap smear: YES - updated in Problem List and Health Maintenance accordingly  Family history of breast CA: No  Family history of uterine/ovarian CA: No    Family history of colon CA: No    HEALTH MAINTENANCE:  Cholesterol: (  Cholesterol   Date Value Ref Range Status   2010 157 0 - 200 mg/dL Final     Comment:     LDL Cholesterol is the primary guide to therapy.   The NCEP recommends further evaluation of: patients with cholesterol <200   mg/dL   if additional risk factors are present, cholesterol >240 mg/dL, triglycerides   >150 mg/dL, or HDL <40 mg/dL.    History of abnormal lipids: No    Mammo:  . History of abnormal Mammo: Not applicable.  Regular Self Breast Exams: Yes    Current or Past (Physical, Sexual or Emotional):  No  Do you feel safe in your environment:  Yes    Calcium/Vitamin D intake: source:  dairy Adequate? Yes   Last TDAP?  Offered today? No    TSH: (  TSH   Date Value Ref Range Status   2010 0.59 0.4 - 5.0 mU/L Final    )  Pap; (  Lab Results   Component Value Date    PAP NIL 2018    PAP ASC-US 2017    PAP NIL 2013    )    HISTORY:  OB History    Para Term  AB Living   5 4 2 2 1 4   SAB TAB Ectopic Multiple Live Births   0 1 0 0 4      # Outcome Date GA Lbr Sonu/2nd Weight Sex Delivery Anes PTL Lv   5  07 34w0d   1.956 kg (4 lb 5 oz) F CS-Unspec   YONATHAN   4  07 34w0d  1.814 kg (4 lb) F CS-Unspec   YONATHAN   3 Term 06 40w0d  3.062 kg (6 lb 12 oz) F    YONATHAN      Birth Comments: EPID      Name: Evita   2 TAB 10/01/05 21w0d    TAB   DEC      Birth Comments: Twin gestation with twin to twin transfusion.  One fetus  at 21 wks and pt and FOB decided to terminate the second due to risks to the remaining fetus   1 Term 00 39w0d 12:00 3.345 kg (7 lb 6 oz) F    YONATHAN      Birth Comments: Hiral 2 months post partum.  No problems with preg      Name: Katya      Obstetric Comments   Twin gestation in :  Urged (per pt) to have an EAB with this pregnancy due to abnormal development of one twin (MFM)     Past Medical History:   Diagnosis Date     ASCUS with positive high risk HPV cervical 2017: Ascus pap, + HR HPV (not 16 or 18 ) result.      Cannabis abuse, episodic     Last used 6 wks ago (3/30/07) per pt.     Other psoriasis and similar disorders      Tobacco use disorder     6 yrs   1 PPD     Past Surgical History:   Procedure Laterality Date     HC REMOVAL GALLBLADDER      after 1st preg     Family History   Problem Relation Age of Onset     Hypertension Paternal Grandmother      Diabetes Paternal Grandmother         type 2     Alcohol/Drug Paternal Grandmother         ETOH     Alcohol/Drug Father         ETOH     Gastrointestinal Disease Paternal Grandfather         Cirrhosis     Cancer No family hx of         No known family hx of skin cancer     Melanoma No family hx of      Skin Cancer No family hx of      Social History:  Patient reports smoking 1/2 ppd.  She states that she is not ready to quit and is not interested in resources.  She states she would just throw them away.  She consumes 7 drinks/ wk (1 per day), and uses THC 2x per week.     :  Current Outpatient Medications:      clotrimazole (LOTRIMIN) 1 % cream, Apply topically 2 times daily, Disp: 30 g, Rfl: 2      fluocinolone (SYNALAR) 0.01 % external solution, APPLY TOPICALLY DAILY TO THE SCALP., Disp: , Rfl: 5     levonorgestrel (MIRENA) 20 MCG/24HR IUD, 1 each (20 mcg) by Intrauterine route once for 1 dose, Disp: 1 each, Rfl: 0   No Known Allergies    Past medical, surgical, social and family history were reviewed and updated in EPIC.    ROS:   C:     NEGATIVE for fever, chills, change in weight  I:       POSITIVE for psoriasis diffusely, NEGATIVE for  moles or lesions  E:     NEGATIVE for vision changes or irritation  E/M: NEGATIVE for ear, mouth and throat problems  R:     NEGATIVE for significant cough or SOB  CV:   NEGATIVE for chest pain, palpitations or peripheral edema  GI:     NEGATIVE for nausea, abdominal pain, heartburn, or change in bowel habits  :   NEGATIVE for frequency, dysuria, hematuria, vaginal discharge, or irregular bleeding  M:     NEGATIVE for significant arthralgias or myalgia  N:      NEGATIVE for weakness, dizziness or paresthesias  E:      NEGATIVE for temperature intolerance, skin/hair changes  P:      NEGATIVE for changes in mood or affect.    EXAM:  BP (!) 141/94   Pulse 112   Wt 74.8 kg (165 lb)   BMI 29.23 kg/m     BMI: Body mass index is 29.23 kg/m .  Constitutional: healthy, alert and no distress  Head: Normocephalic. No masses, lesions, tenderness or abnormalities  Neck: Neck supple. Trachea midline. No adenopathy. Thyroid symmetric, normal size.   Cardiovascular: RRR.   Respiratory: clear to auscultation bilaterally   Breasts: normal without suspicious masses or axillary nodes.  Gastrointestinal: Abdomen soft, non-tender, non-distended. No masses  :  Vulva:  No external lesions, no inguinal adenopathy.    Urethra:  Midline, non-tender, well supported, no discharge  Vagina:  Moist, pink, no abnormal discharge, no lesions  Uterus:  Normal size, non-tender, freely mobile  Ovaries:  No masses appreciated, non-tender, mobile  Rectal Exam: deferred  Musculoskeletal: extremities  normal  Skin: diffuse psoriatic patches on bilateral arms and legs   Psychiatric: Affect appropriate, cooperative,mentation appears normal.     ASSESSMENT and Plan:  Ryan is a 39 year old  female who presents for annual exam.   (Z01.419) Well woman exam with routine gynecological exam  (primary encounter diagnosis)  Comment:   COUNSELING:   Reviewed preventive health counseling, as reflected in patient instructions       Healthy diet/nutrition       Immunizations    Declined: Influenza due to Conscientious objector           Contraception   reports that she has been smoking cigarettes, 1/2 ppd  Tobacco Cessation Action Plan: Information offered: Patient not interested at this time  Body mass index is 29.23 kg/m .  Weight management plan: Discussed healthy diet and exercise guidelines    (Z13.220) Screening cholesterol level  Comment: patient due for cholesterol screen  Plan: Lipid panel reflex to direct LDL Fasting    (Z13.1) Screening for diabetes mellitus  Comment: patient overweight and is due for DM screening  Plan: Hemoglobin A1c    (Z11.3) Screen for STD (sexually transmitted disease)  Comment: patient requests GC/CT testing  Plan: NEISSERIA GONORRHOEA PCR, CHLAMYDIA TRACHOMATIS        PCR    (Z71.6) Encounter for smoking cessation counseling  Comment: Discussed with patient benefits of smoking cessation and risks of continued tobacco use.  She is not interested in quitting.  She refuses information on resources.   Plan: continue to address    (R03.0) Elevated blood pressure reading without diagnosis of hypertension  Comment: Patient with elevated blood pressure today.  Reviewed diet change, exercise, and weight loss as well as quitting smoking as lifestyle changes to reduce blood pressure.   Plan: continue to monitor    Maya Arthur MD

## 2019-12-19 ENCOUNTER — TELEPHONE (OUTPATIENT)
Dept: MIDWIFE SERVICES | Facility: CLINIC | Age: 39
End: 2019-12-19

## 2019-12-19 DIAGNOSIS — R87.810 ASCUS WITH POSITIVE HIGH RISK HPV CERVICAL: ICD-10-CM

## 2019-12-19 DIAGNOSIS — R87.610 ASCUS WITH POSITIVE HIGH RISK HPV CERVICAL: ICD-10-CM

## 2019-12-19 NOTE — TELEPHONE ENCOUNTER
Pt is past due for f/u pap smear.  [facility] clinic and schedule.    Kelli Blackwood  Pap Tracking

## 2020-01-20 PROBLEM — R87.810 ASCUS WITH POSITIVE HIGH RISK HPV CERVICAL: Status: ACTIVE | Noted: 2017-07-18

## 2020-01-20 PROBLEM — R87.610 ASCUS WITH POSITIVE HIGH RISK HPV CERVICAL: Status: ACTIVE | Noted: 2017-07-18

## 2021-01-15 ENCOUNTER — HEALTH MAINTENANCE LETTER (OUTPATIENT)
Age: 41
End: 2021-01-15

## 2021-09-04 ENCOUNTER — HEALTH MAINTENANCE LETTER (OUTPATIENT)
Age: 41
End: 2021-09-04

## 2022-02-19 ENCOUNTER — HEALTH MAINTENANCE LETTER (OUTPATIENT)
Age: 42
End: 2022-02-19

## 2022-07-19 ENCOUNTER — OFFICE VISIT (OUTPATIENT)
Dept: MIDWIFE SERVICES | Facility: CLINIC | Age: 42
End: 2022-07-19
Payer: COMMERCIAL

## 2022-07-19 VITALS
BODY MASS INDEX: 30.33 KG/M2 | HEART RATE: 90 BPM | SYSTOLIC BLOOD PRESSURE: 123 MMHG | DIASTOLIC BLOOD PRESSURE: 81 MMHG | WEIGHT: 171.2 LBS

## 2022-07-19 DIAGNOSIS — Z11.3 SCREEN FOR STD (SEXUALLY TRANSMITTED DISEASE): ICD-10-CM

## 2022-07-19 DIAGNOSIS — Z12.4 SCREENING FOR CERVICAL CANCER: Primary | ICD-10-CM

## 2022-07-19 PROCEDURE — 87624 HPV HI-RISK TYP POOLED RSLT: CPT | Performed by: ADVANCED PRACTICE MIDWIFE

## 2022-07-19 PROCEDURE — 99213 OFFICE O/P EST LOW 20 MIN: CPT | Performed by: ADVANCED PRACTICE MIDWIFE

## 2022-07-19 PROCEDURE — G0145 SCR C/V CYTO,THINLAYER,RESCR: HCPCS | Performed by: ADVANCED PRACTICE MIDWIFE

## 2022-07-19 PROCEDURE — 87591 N.GONORRHOEAE DNA AMP PROB: CPT | Performed by: ADVANCED PRACTICE MIDWIFE

## 2022-07-19 PROCEDURE — 87491 CHLMYD TRACH DNA AMP PROBE: CPT | Performed by: ADVANCED PRACTICE MIDWIFE

## 2022-07-19 NOTE — PROGRESS NOTES
S: Patient initially presented to clinic for IUD removal and replace. IUD was placed 7/18/2017 in our clinic. Patient reports having no issues with IUD. Does not get periods and likes this method of contraception. Discussed that the new timeframe is 7 years so gave patient option to replace today or return to clinic in 2 years for replacement. Due for pap and accepts STD screening.     O: /81   Pulse 90   Wt 77.7 kg (171 lb 3.2 oz)   BMI 30.33 kg/m      General: well appearing, in NAD  Cardiac: well perfused  Respiratory: non-labored breathing on room air   Psych: alert and oriented   Pelvic exam: normal vagina and vulva, normal cervix without lesions or tenderness, IUD strings noted, pap smear and GC/CT done.    A/P:   (Z12.4) Screening for cervical cancer  (primary encounter diagnosis)  Comment:   Plan: Pap imaged thin layer screen with HPV -         recommended age 30 - 65 years (select HPV order        below)            (Z11.3) Screen for STD (sexually transmitted disease)  Comment:   Plan: NEISSERIA GONORRHOEA PCR, CHLAMYDIA TRACHOMATIS        PCR          Pt prefers to leave IUD in for 2 more years. Discussed that if she starts to have breakthrough bleeding or return to menses would recommend returning to clinic sooner for replacement at that time.   Will notify patient of lab results via MyChart or phone call if f/u  Needed.  Sonia Bishop CNM

## 2022-07-20 LAB
C TRACH DNA SPEC QL NAA+PROBE: NEGATIVE
N GONORRHOEA DNA SPEC QL NAA+PROBE: NEGATIVE

## 2022-07-21 DIAGNOSIS — A59.9 TRICHIMONIASIS: Primary | ICD-10-CM

## 2022-07-21 LAB
BKR LAB AP GYN ADEQUACY: NORMAL
BKR LAB AP GYN INTERPRETATION: NORMAL
BKR LAB AP GYN OTHER FINDINGS: NORMAL
BKR LAB AP HPV REFLEX: NORMAL
BKR LAB AP PREVIOUS ABNL DX: NORMAL
BKR LAB AP PREVIOUS ABNORMAL: NORMAL
PATH REPORT.COMMENTS IMP SPEC: NORMAL
PATH REPORT.COMMENTS IMP SPEC: NORMAL
PATH REPORT.RELEVANT HX SPEC: NORMAL

## 2022-07-21 RX ORDER — METRONIDAZOLE 500 MG/1
500 TABLET ORAL 2 TIMES DAILY
Qty: 14 TABLET | Refills: 0 | Status: SHIPPED | OUTPATIENT
Start: 2022-07-21 | End: 2022-07-28

## 2022-07-21 NOTE — PROGRESS NOTES
+Trichomonas vaginalis noted on pap smear    RX for treatment sent to patient preferred pharmacy.

## 2022-07-25 LAB
HUMAN PAPILLOMA VIRUS 16 DNA: NEGATIVE
HUMAN PAPILLOMA VIRUS 18 DNA: NEGATIVE
HUMAN PAPILLOMA VIRUS FINAL DIAGNOSIS: NORMAL
HUMAN PAPILLOMA VIRUS OTHER HR: NEGATIVE

## 2022-10-04 ENCOUNTER — OFFICE VISIT (OUTPATIENT)
Dept: MIDWIFE SERVICES | Facility: CLINIC | Age: 42
End: 2022-10-04
Payer: COMMERCIAL

## 2022-10-04 ENCOUNTER — MYC MEDICAL ADVICE (OUTPATIENT)
Dept: MIDWIFE SERVICES | Facility: CLINIC | Age: 42
End: 2022-10-04

## 2022-10-04 VITALS
DIASTOLIC BLOOD PRESSURE: 103 MMHG | BODY MASS INDEX: 29.99 KG/M2 | SYSTOLIC BLOOD PRESSURE: 155 MMHG | WEIGHT: 169.3 LBS

## 2022-10-04 DIAGNOSIS — B96.89 BACTERIAL VAGINOSIS: Primary | ICD-10-CM

## 2022-10-04 DIAGNOSIS — Z11.3 SCREEN FOR STD (SEXUALLY TRANSMITTED DISEASE): Primary | ICD-10-CM

## 2022-10-04 DIAGNOSIS — N76.0 BACTERIAL VAGINOSIS: Primary | ICD-10-CM

## 2022-10-04 LAB
CLUE CELLS: PRESENT
TRICHOMONAS, WET PREP: ABNORMAL
WBC'S/HIGH POWER FIELD, WET PREP: ABNORMAL
YEAST, WET PREP: ABNORMAL

## 2022-10-04 PROCEDURE — 87210 SMEAR WET MOUNT SALINE/INK: CPT | Performed by: ADVANCED PRACTICE MIDWIFE

## 2022-10-04 PROCEDURE — 99213 OFFICE O/P EST LOW 20 MIN: CPT | Performed by: ADVANCED PRACTICE MIDWIFE

## 2022-10-04 RX ORDER — DESONIDE 0.5 MG/G
OINTMENT TOPICAL
COMMUNITY
Start: 2017-06-22

## 2022-10-04 RX ORDER — CLOBETASOL PROPIONATE 0.5 MG/G
CREAM TOPICAL
COMMUNITY
Start: 2022-03-29

## 2022-10-04 NOTE — PROGRESS NOTES
SUBJECTIVE:   Pt presents to clinic for repeat STD testing. Was diagnosed with trichomonas on a pap smear in July. Has never had any symptoms. Partner treated. Denies abnormal vaginal bleeding or significant pelvic pain or fever. No UTI symptoms. Denies history of known exposure to STD.    No LMP recorded (lmp unknown). (Menstrual status: IUD).    OBJECTIVE:   BP (!) 155/103   Wt 76.8 kg (169 lb 4.8 oz)   LMP  (LMP Unknown)   BMI 29.99 kg/m      General: well appearing, in NAD  Cardiac: well perfused  Respiratory: non-labored breathing on room air   Psych: alert and oriented     ASSESSMENT/PLAN:   (Z11.3) Screen for STD (sexually transmitted disease)  (primary encounter diagnosis)  Comment:   Plan: Wet prep - lab collect          Will notify patient of results via MyChart  Encouraged patient to monitor blood pressure and make appointment with PCP if consistently elevated  Sonia Bishop CNM

## 2022-10-04 NOTE — TELEPHONE ENCOUNTER
Pt with clue cells on wet prep responds to result note on mychart stating she would like treatment.   She has had some vaginal itching.    Pharmacy verified.

## 2022-10-05 RX ORDER — METRONIDAZOLE 500 MG/1
500 TABLET ORAL 2 TIMES DAILY
Qty: 14 TABLET | Refills: 0 | Status: SHIPPED | OUTPATIENT
Start: 2022-10-05 | End: 2022-10-12

## 2022-10-05 NOTE — TELEPHONE ENCOUNTER
Rx sent for Flagyl 500mg BID x7d to treat BV per patient request. Will notify patient via [employer] message. Sonia Bishop CNM

## 2022-10-22 ENCOUNTER — HEALTH MAINTENANCE LETTER (OUTPATIENT)
Age: 42
End: 2022-10-22

## 2023-04-01 ENCOUNTER — HEALTH MAINTENANCE LETTER (OUTPATIENT)
Age: 43
End: 2023-04-01

## 2024-03-24 ENCOUNTER — HEALTH MAINTENANCE LETTER (OUTPATIENT)
Age: 44
End: 2024-03-24

## 2024-06-02 ENCOUNTER — HEALTH MAINTENANCE LETTER (OUTPATIENT)
Age: 44
End: 2024-06-02

## 2025-06-15 ENCOUNTER — HEALTH MAINTENANCE LETTER (OUTPATIENT)
Age: 45
End: 2025-06-15

## 2025-07-18 NOTE — PROGRESS NOTES
SUBJECTIVE:    Is a pregnancy test required: Yes.  Was it positive or negative?  Negative  Was a consent obtained?  Yes    Subjective: Ryan Franco is a 44 year old  presents for IUD and desires Mirena type IUD.  She requests removal of the IUD because the IUD effectiveness has     Patient has been given the opportunity to ask questions about all forms of birth control, including all options appropriate for Ryan Franco. Discussed that no method of birth control, except abstinence is 100% effective against pregnancy or sexually transmitted infection.     Ryan Franco understands she may have the IUD removed at any time. IUD should be removed by a health care provider and the current IUD will be removed today.    The entire removal and insertion procedure was reviewed with the patient, including care after placement.    Today's PHQ-2 Score:       2013    11:41 AM   PHQ-2 (  [employer])   Q1: Little interest or pleasure in doing things 0    Q2: Feeling down, depressed or hopeless 0    PHQ-2 Score 0        Data saved with a previous flowsheet row definition         PROCEDURE:    A speculum exam was performed and the cervix was visualized. The IUD string was visualized. Using ring forceps, the string  was grasped and the IUD removed intact.    Under sterile technique, cervix was visualized with speculum and prepped with Betadine solution swab x 3. Allis was placed for stability. The uterus was gently straightened and sounded to 7.5 cm. IUD prepared for placement, and IUD inserted according to 's instructions without difficulty or significant ressitance, and deployed at the fundus. The strings were visualized and trimmed to 3.0 cm from the external os. Tenaculum was removed and hemostasis noted. Speculum removed.  Patient tolerated procedure well.    EBL: minimal    Complications: none      POST PROCEDURE:    Given 's handouts, including when to  have IUD removed, list of danger s/sx, side effects and follow up recommended. Encouraged condom use for prevention of STD. Advised to call for any fever, for prolonged or severe pain or bleeding, abnormal vaginal dischage, or unable to palpate strings. She was advised to use pain medications (ibuprofen) as needed for mild to moderate pain. Advised to follow-up in clinic in 4-6 weeks for IUD string check if unable to find strings or as directed by provider.     SYED Rodriguez CNP

## 2025-07-18 NOTE — PATIENT INSTRUCTIONS
If you have labs or imaging done, the results will automatically release in [employer] without an interpretation.  Your health care professional will review those results and send an interpretation with recommendations as soon as possible, but this may be 1-3 business days.    If you have any questions regarding your visit, please contact your care team.     [employer] Access Services: [phone]  [facility] CLINIC HOURS TELEPHONE NUMBER   Shelby Eduardo, PAUL Coto-RN  Ju-RN  Paulette Baires-Surgery Scheduler  Simi-           Tuesday- [place]  8:00 am-4:00 pm    Wednesday- [place] 8:00 am-4:00 pm    Thursday- [place] 8:00 am-4:00 pm    Friday- [place]  8:00 am-4:00 pm [facility]  [address] [place] [address]  PH: [phone]  Fax: [phone]    Imaging Scheduling all locations  PH: [phone] ([place])    [facility]  [address] [address]  [phone]    [facility]  [address] [place] [address]  PH: [phone]     **Surgeries** Our Surgery Schedulers will contact you to schedule. If you do not receive a call within 3 business days, please call [phone].  Urgent Care locations:  [facility]       Monday-Friday   10 am - 8 pm    Saturday and Sunday   9 am - 5 pm   [phone]   If you need a medication refill, please contact your pharmacy. Please allow 3 business days for your refill to be completed.  As always, Thank you for trusting us with your healthcare needs!

## 2025-07-23 ENCOUNTER — OFFICE VISIT (OUTPATIENT)
Dept: OBGYN | Facility: CLINIC | Age: 45
End: 2025-07-23
Payer: COMMERCIAL

## 2025-07-23 VITALS
BODY MASS INDEX: 31.15 KG/M2 | OXYGEN SATURATION: 99 % | HEIGHT: 63 IN | SYSTOLIC BLOOD PRESSURE: 150 MMHG | DIASTOLIC BLOOD PRESSURE: 102 MMHG | HEART RATE: 108 BPM | WEIGHT: 175.8 LBS

## 2025-07-23 DIAGNOSIS — Z97.5 IUD (INTRAUTERINE DEVICE) IN PLACE: ICD-10-CM

## 2025-07-23 DIAGNOSIS — Z32.00 ENCOUNTER FOR PREGNANCY TEST, RESULT UNKNOWN: ICD-10-CM

## 2025-07-23 DIAGNOSIS — Z30.433 ENCOUNTER FOR REMOVAL AND REINSERTION OF INTRAUTERINE CONTRACEPTIVE DEVICE: Primary | ICD-10-CM

## 2025-07-23 LAB
HCG UR QL: NEGATIVE
INTERNAL QC OK POCT: NORMAL
POCT KIT EXPIRATION DATE: NORMAL
POCT KIT LOT NUMBER: NORMAL

## 2025-07-23 PROCEDURE — 81025 URINE PREGNANCY TEST: CPT

## 2025-07-23 PROCEDURE — 58301 REMOVE INTRAUTERINE DEVICE: CPT

## 2025-07-23 PROCEDURE — 3077F SYST BP >= 140 MM HG: CPT

## 2025-07-23 PROCEDURE — 58300 INSERT INTRAUTERINE DEVICE: CPT

## 2025-07-23 PROCEDURE — 3080F DIAST BP >= 90 MM HG: CPT
